# Patient Record
Sex: FEMALE | Race: WHITE | NOT HISPANIC OR LATINO | URBAN - METROPOLITAN AREA
[De-identification: names, ages, dates, MRNs, and addresses within clinical notes are randomized per-mention and may not be internally consistent; named-entity substitution may affect disease eponyms.]

---

## 2017-03-15 ENCOUNTER — EMERGENCY (EMERGENCY)
Facility: HOSPITAL | Age: 67
LOS: 1 days | Discharge: HOME | End: 2017-03-15
Attending: EMERGENCY MEDICINE | Admitting: EMERGENCY MEDICINE
Payer: COMMERCIAL

## 2017-03-15 VITALS
DIASTOLIC BLOOD PRESSURE: 86 MMHG | TEMPERATURE: 98 F | HEART RATE: 99 BPM | SYSTOLIC BLOOD PRESSURE: 151 MMHG | RESPIRATION RATE: 19 BRPM | OXYGEN SATURATION: 99 % | WEIGHT: 225.09 LBS | HEIGHT: 65 IN

## 2017-03-15 DIAGNOSIS — S82.831A OTHER FRACTURE OF UPPER AND LOWER END OF RIGHT FIBULA, INITIAL ENCOUNTER FOR CLOSED FRACTURE: ICD-10-CM

## 2017-03-15 DIAGNOSIS — M25.571 PAIN IN RIGHT ANKLE AND JOINTS OF RIGHT FOOT: ICD-10-CM

## 2017-03-15 DIAGNOSIS — I10 ESSENTIAL (PRIMARY) HYPERTENSION: ICD-10-CM

## 2017-03-15 PROCEDURE — 73630 X-RAY EXAM OF FOOT: CPT | Mod: 26,RT

## 2017-03-15 PROCEDURE — 99284 EMERGENCY DEPT VISIT MOD MDM: CPT | Mod: 25

## 2017-03-15 PROCEDURE — 73610 X-RAY EXAM OF ANKLE: CPT | Mod: 26,RT

## 2017-03-15 PROCEDURE — 73610 X-RAY EXAM OF ANKLE: CPT | Mod: 26,77,RT

## 2017-03-15 PROCEDURE — 27840 TREAT ANKLE DISLOCATION: CPT | Mod: 54,RT,59

## 2017-03-15 PROCEDURE — 27788 TREATMENT OF ANKLE FRACTURE: CPT | Mod: 54,RT

## 2017-03-15 RX ORDER — TRAMADOL HYDROCHLORIDE 50 MG/1
50 TABLET ORAL ONCE
Qty: 0 | Refills: 0 | Status: DISCONTINUED | OUTPATIENT
Start: 2017-03-15 | End: 2017-03-15

## 2017-03-15 RX ORDER — DOCUSATE SODIUM 100 MG
1 CAPSULE ORAL
Qty: 20 | Refills: 0 | OUTPATIENT
Start: 2017-03-15

## 2017-03-15 RX ADMIN — TRAMADOL HYDROCHLORIDE 50 MILLIGRAM(S): 50 TABLET ORAL at 14:20

## 2017-03-15 NOTE — ED PROVIDER NOTE - MEDICAL DECISION MAKING DETAILS
pt found to have closed R distal fib fx above the mortise, joint space intact otherwise, closed reduction performed at bedside by me, pt placed in posterior U splint, post-reduction film reveals adequate alignment, pt is NV intact post-splinting, pt instructed to RICE affected joint and f/u with Dr. Calloway in 1 wk, crutches, splint care and d/c instructions provided. pt found to have closed R distal fib fx above the mortise with slight widening, closed reduction performed at bedside by me, pt placed in posterior U splint, post-reduction film reveals adequate alignment, pt is NV intact post-splinting, pt instructed to RICE affected joint and f/u with Dr. Calloway in 1 wk, crutches, splint care and d/c instructions provided. pt found to have closed R distal fib fx above the mortise with slight widening, closed reduction performed at bedside by me, pt placed in posterior U splint, post-reduction film reveals adequate alignment, pt is NV intact post-splinting, pt instructed to RICE affected joint and f/u with Dr. Calloway in 1 wk, crutches, splint care and d/c instructions provided. Social work consulted for appt, has appt for next wk, given home care information as well

## 2017-03-15 NOTE — ED PROVIDER NOTE - OBJECTIVE STATEMENT
67 yo F with PMHx of HTN and hypothyroidism, presenting c/o R ankle and foot pain s/p fall this morning. Pt slipped on black ice and landed on her ankle. Noted swelling and progressively worsening pain.  Denies prodrome, head trauma, LOC, break in the skin, paresthesia, numbness, tingling, redness, bleeding, d/c, HA, dizziness, SOB, CP, palpitations, N/V, focal weakness, and malaise. 67 yo F with PMHx of HTN and hypothyroidism, presenting c/o R ankle and foot pain s/p fall this morning. Pt slipped on black ice and landed on her ankle. Noted swelling and progressively worsening pain.  Denies prodrome, head trauma, LOC, break in the skin, paresthesia, numbness, tingling, redness, bleeding, d/c, HA, dizziness, SOB, CP, palpitations, N/V, focal weakness, and malaise. Pt is able to partially weight bear s/p fall but reports feeling unstable when she walks

## 2017-03-15 NOTE — ED PROCEDURE NOTE - NS ED PERI NEURO NEG
Post-application: Motor, sensory, and vascular responses intact in the injured extremity./Pre-application: Motor, sensory, and vascular responses intact in the injured extremity.
Pre-application: Motor, sensory, and vascular responses intact in the injured extremity./Post-application: Motor, sensory, and vascular responses intact in the injured extremity.

## 2017-03-15 NOTE — ED PROVIDER NOTE - DIAGNOSTIC INTERPRETATION
Xray (wet reads) interpreted by TAYLOR JUAREZ   Xryuliana foot/ankle - Xray (wet reads) interpreted by TAYLOR JUAREZ   Xray foot/ankle - +soft tissue swelling with acute slightly displaced spiral fx of the distal fibular region, no dislocation, joint space intact, no effusion noted Xray (wet reads) interpreted by TAYLOR JUAREZ   Xray foot/ankle - +soft tissue swelling with acute slightly displaced spiral fx of the distal fibular region with widening of the mortise, no dislocation, joint space intact, no effusion noted, +DJD changes

## 2017-03-15 NOTE — ED BEHAVIORAL HEALTH NOTE - BEHAVIORAL HEALTH NOTE
Zahida was consulted to the ED to help arrange an appointment for the patient. Patient was in agreement with the doctors that were provided by the MD and did not have a preference as to what time or day. Appointment was made with Dr. Calloway for 3/22/17 @ 2:30 pm. Patient was provided with date and time as well as address and phone number to office. Patient was also provided with workers contact information should she have any more questions.

## 2017-03-15 NOTE — ED PROVIDER NOTE - DETAILS:
distal fibula fracture with widened mortise, reduced and splinted, neurovascularly intact, NWB, follow up with ortho

## 2017-03-15 NOTE — ED PROCEDURE NOTE - NS ED PERI VASCULAR NEG
no paresthesia/no cyanosis of extremity/capillary refill time < 2 seconds
no cyanosis of extremity/fingers/toes warm to touch/no paresthesia/capillary refill time < 2 seconds

## 2017-03-15 NOTE — ED PROCEDURE NOTE - CPROC ED POST PROC CARE GUIDE1
Instructed patient/caregiver to follow-up with primary care physician./Elevate the injured extremity as instructed./Verbal/written post procedure instructions were given to patient/caregiver./Keep the cast/splint/dressing clean and dry.

## 2017-03-15 NOTE — ED PROVIDER NOTE - MUSCULOSKELETAL, MLM
Spine appears normal, R foot and ankle +edema and TTP over lateral mal and medial dorsum foot, no erythema, ecchymosis, crepitus, joint laxity, or deformity, restricted ROM 2/2 pain, NV intact. able to partially weight bear with assistance

## 2017-03-15 NOTE — ED ADULT NURSE NOTE - OBJECTIVE STATEMENT
pt slipped twisting right ankle. Pt states when she walks on the right foot she feels her bone move. Area is swollen and red.

## 2017-03-23 ENCOUNTER — INPATIENT (INPATIENT)
Facility: HOSPITAL | Age: 67
LOS: 0 days | Discharge: HOME | DRG: 494 | End: 2017-03-24
Attending: ORTHOPAEDIC SURGERY | Admitting: ORTHOPAEDIC SURGERY
Payer: COMMERCIAL

## 2017-03-23 VITALS
OXYGEN SATURATION: 97 % | HEART RATE: 93 BPM | DIASTOLIC BLOOD PRESSURE: 85 MMHG | WEIGHT: 225.09 LBS | TEMPERATURE: 98 F | RESPIRATION RATE: 18 BRPM | SYSTOLIC BLOOD PRESSURE: 147 MMHG

## 2017-03-23 DIAGNOSIS — E03.9 HYPOTHYROIDISM, UNSPECIFIED: ICD-10-CM

## 2017-03-23 DIAGNOSIS — Z01.818 ENCOUNTER FOR OTHER PREPROCEDURAL EXAMINATION: ICD-10-CM

## 2017-03-23 DIAGNOSIS — S82.891A OTHER FRACTURE OF RIGHT LOWER LEG, INITIAL ENCOUNTER FOR CLOSED FRACTURE: ICD-10-CM

## 2017-03-23 PROCEDURE — 99253 IP/OBS CNSLTJ NEW/EST LOW 45: CPT

## 2017-03-23 PROCEDURE — 99285 EMERGENCY DEPT VISIT HI MDM: CPT | Mod: 25

## 2017-03-23 PROCEDURE — 93010 ELECTROCARDIOGRAM REPORT: CPT

## 2017-03-23 PROCEDURE — 71010: CPT | Mod: 26

## 2017-03-23 RX ORDER — DOCUSATE SODIUM 100 MG
1 CAPSULE ORAL
Qty: 42 | Refills: 0 | OUTPATIENT
Start: 2017-03-23 | End: 2017-04-06

## 2017-03-23 RX ORDER — OXYCODONE HYDROCHLORIDE 5 MG/1
5 TABLET ORAL EVERY 4 HOURS
Qty: 0 | Refills: 0 | Status: DISCONTINUED | OUTPATIENT
Start: 2017-03-23 | End: 2017-03-24

## 2017-03-23 RX ORDER — METOCLOPRAMIDE HCL 10 MG
10 TABLET ORAL ONCE
Qty: 0 | Refills: 0 | Status: DISCONTINUED | OUTPATIENT
Start: 2017-03-23 | End: 2017-03-24

## 2017-03-23 RX ORDER — ONDANSETRON 8 MG/1
4 TABLET, FILM COATED ORAL EVERY 6 HOURS
Qty: 0 | Refills: 0 | Status: DISCONTINUED | OUTPATIENT
Start: 2017-03-23 | End: 2017-03-24

## 2017-03-23 RX ORDER — ASPIRIN/CALCIUM CARB/MAGNESIUM 324 MG
325 TABLET ORAL
Qty: 0 | Refills: 0 | Status: DISCONTINUED | OUTPATIENT
Start: 2017-03-23 | End: 2017-03-24

## 2017-03-23 RX ORDER — ACETAMINOPHEN 500 MG
650 TABLET ORAL EVERY 6 HOURS
Qty: 0 | Refills: 0 | Status: DISCONTINUED | OUTPATIENT
Start: 2017-03-23 | End: 2017-03-24

## 2017-03-23 RX ORDER — SODIUM CHLORIDE 9 MG/ML
1000 INJECTION INTRAMUSCULAR; INTRAVENOUS; SUBCUTANEOUS
Qty: 0 | Refills: 0 | Status: DISCONTINUED | OUTPATIENT
Start: 2017-03-23 | End: 2017-03-24

## 2017-03-23 RX ORDER — SODIUM CHLORIDE 9 MG/ML
1000 INJECTION, SOLUTION INTRAVENOUS
Qty: 0 | Refills: 0 | Status: DISCONTINUED | OUTPATIENT
Start: 2017-03-23 | End: 2017-03-24

## 2017-03-23 RX ORDER — OXYCODONE HYDROCHLORIDE 5 MG/1
10 TABLET ORAL EVERY 4 HOURS
Qty: 0 | Refills: 0 | Status: DISCONTINUED | OUTPATIENT
Start: 2017-03-23 | End: 2017-03-24

## 2017-03-23 RX ORDER — MORPHINE SULFATE 50 MG/1
4 CAPSULE, EXTENDED RELEASE ORAL EVERY 4 HOURS
Qty: 0 | Refills: 0 | Status: DISCONTINUED | OUTPATIENT
Start: 2017-03-23 | End: 2017-03-24

## 2017-03-23 RX ORDER — INFLUENZA VIRUS VACCINE 15; 15; 15; 15 UG/.5ML; UG/.5ML; UG/.5ML; UG/.5ML
0.5 SUSPENSION INTRAMUSCULAR ONCE
Qty: 0 | Refills: 0 | Status: DISCONTINUED | OUTPATIENT
Start: 2017-03-23 | End: 2017-03-24

## 2017-03-23 RX ORDER — CEFAZOLIN SODIUM 1 G
2000 VIAL (EA) INJECTION EVERY 8 HOURS
Qty: 0 | Refills: 0 | Status: COMPLETED | OUTPATIENT
Start: 2017-03-23 | End: 2017-03-24

## 2017-03-23 RX ORDER — MORPHINE SULFATE 50 MG/1
4 CAPSULE, EXTENDED RELEASE ORAL
Qty: 0 | Refills: 0 | Status: DISCONTINUED | OUTPATIENT
Start: 2017-03-23 | End: 2017-03-24

## 2017-03-23 RX ORDER — ASPIRIN/CALCIUM CARB/MAGNESIUM 324 MG
1 TABLET ORAL
Qty: 56 | Refills: 0 | OUTPATIENT
Start: 2017-03-23

## 2017-03-23 RX ADMIN — Medication 100 MILLIGRAM(S): at 23:38

## 2017-03-23 NOTE — H&P ADULT - NSHPPHYSICALEXAM_GEN_ALL_CORE
PE:  Gen: WD/WN in NAD, body habitus appropriate for age  Skin: Warm/dry, good mobility/turgor. No rashes or discoloration around toes or r ankle splint  MS: EHL/FHL strength 5/5 BL LE, unable to assess right TA/GS due to splint. Sensation intact BL LE. No discoloration, warmth, or pallor to toes

## 2017-03-23 NOTE — H&P ADULT - NSHPREVIEWOFSYSTEMS_GEN_ALL_CORE
ROS:  Gen: Denies headaches, weight loss/gain, fatigue, fever, chills, sweats  MS: Denies loss of mobility to toes, knees. Denies pain in toes or knees. Denies numbness, tingling, loss of sensation to BL  see HPI

## 2017-03-23 NOTE — H&P ADULT - ASSESSMENT
66F with right ankle fracture s/p fall 1 week ago    NPO  Added on to OR today for ankle ORIF  Discussed with Dr Calloway

## 2017-03-23 NOTE — ED ADULT TRIAGE NOTE - CHIEF COMPLAINT QUOTE
right ankle pain x 2 weeks. Sent here by Dr. Bernabe Ko for surgery to right ankle fx.  Denies any other symptoms

## 2017-03-23 NOTE — ED ADULT NURSE NOTE - OBJECTIVE STATEMENT
Pt came to ED under direction of her PCP for pre op for right ankle surgery. Pt currently denies pain. Pt states she fractured her ankle 1 week ago.  Right lower leg and ankle wrapped in cast. Pt denies chest pain, SOB, abd pain, /GI symptoms, D/N/V.  Pt is alert and oriented x3, Positive PMS x4.

## 2017-03-23 NOTE — PROGRESS NOTE ADULT - SUBJECTIVE AND OBJECTIVE BOX
Ortho Post Op Check    Pt comfortable without complaints, pain controlled  Denies CP, SOB, N/V, numbness/tingling     Vital Signs Last 24 Hrs  T(C): 36.6, Max: 36.6 (03-23 @ 18:05)  T(F): 97.8, Max: 97.8 (03-23 @ 18:05)  HR: 66 (65 - 93)  BP: 142/74 (137/72 - 147/85)  BP(mean): --  RR: 14 (13 - 17)  SpO2: 100% (97% - 100%)  Wt(kg): --  AVSS    General: Pt Alert and oriented, NAD    Affected extremity  DSG C/D/I  Motor:  not able to assess EHL/FHL/TA/GS, patient in splint  Sensory: unable to assess  wwp                                                                       13.9   10.5  )-----------( 295      ( 23 Mar 2017 12:24 )             39.9   23 Mar 2017 12:23    144    |  109    |  14     ----------------------------<  81     3.3     |  24     |  0.70       TPro  7.8    /  Alb  3.9    /  TBili  1.2    /  DBili  x      /  AST  24     /  ALT  29     /  AlkPhos  65     23 Mar 2017 12:23      Post-op X-Ray:    A/P: 66yFemale POD#0 s/p R ankle ORIF  - Stable  - Pain Control  - DVT ppx: ASA  - Post op abx: Ancef  - PT, WBS:NWB RLE

## 2017-03-23 NOTE — H&P ADULT - HISTORY OF PRESENT ILLNESS
67 yo F here w/ known right ankle fracture s/p fall in snow 1 week ago. Pt states she missed a step and tripped, and when she got up to walk her ankle was in sharp pain, 5/10 in severity, that only hurts her when walking on it. She went to [facility] where she was given Percocet w/ good relief, radiographed, and splinted. At her follow up appointment yesterday she reports her ankle was swollen and bruised so she was radiographed again and recommended for surgery. Today she is at [facility] ED, stable, and in NAD. Denies numbness, tingling, loss of mobility in toes, fever, chills, sweats, dizziness.    Allergies: NKDA, NKFA  Meds: Synthroid 175 mcg  Illnesses: Hypothyroidism, well-controlled; Fibroids  SurgHx: Myomectomy 20+ years ago under general anesthesia, no complications   FamHx: DM on fathers side, no bleeding disorders or other conditions  SocHx: Never smoker, drinks 3 glasses wine/week. No recreational drugs. Eats a healthy diet[place]

## 2017-03-23 NOTE — ED PROVIDER NOTE - OBJECTIVE STATEMENT
67 y/o f presents to ED for admission for right fibula fracture sustained one week prior from fall.  Pt splinted by Nayla hernandez and followed up with Dr. Calloway day prior and instructed to present to ED.  Denies other injury at this time.  Only other sig hx hypothyroid on synthroid.

## 2017-03-23 NOTE — CONSULT NOTE ADULT - SUBJECTIVE AND OBJECTIVE BOX
CC: mild pain on right ankle.    ROS is otherwise negative.    Vital Signs Last 24 Hrs  T(C): 36.4, Max: 36.4 (03-23 @ 10:55)  T(F): 97.6, Max: 97.6 (03-23 @ 10:55)  HR: 93 (93 - 93)  BP: 147/85 (147/85 - 147/85)  BP(mean): --  RR: 17 (17 - 18)  SpO2: 97% (97% - 97%)    PHYSICAL EXAMINATION  * General: Not in acute distress. Awake and alert. Lying comfortably in bed.  * Head: Normocephalic, atraumatic.  * HEENT: ears no discharge, eyes PERRLA, nose no discharge, throat no exudates, normal tonsils.  * Neck: no JVD, supple.  * Lungs: Clear to auscultation, no rales, no wheezes.  * Cardio: Regular rate and rhythm, no murmurs, no rubs, no gallops. Good peripheral pulses.  * Abdomen: Soft, non-tender, non-distended, tympanic to percussion, no rebound, no guarding, no rigidity. Bowel sounds present. No suprapubic or CVA tenderness.  * : Deferred.  * Extremities: Acyanotic, no edema.  * Skin: Warm and dry.  * Neuro: Alert and oriented x 3. No focal deficits. Motor strength is 5/5 throughout. Sensation intact. Cranial nerves II-XII grossly intact.                           13.9   10.5  )-----------( 295      ( 23 Mar 2017 12:24 )             39.9     23 Mar 2017 12:23    144    |  109    |  14     ----------------------------<  81     3.3     |  24     |  0.70     Ca    9.2        23 Mar 2017 12:23    TPro  7.8    /  Alb  3.9    /  TBili  1.2    /  DBili  x      /  AST  24     /  ALT  29     /  AlkPhos  65     23 Mar 2017 12:23    PT/INR - ( 23 Mar 2017 12:24 )   PT: 12.1 sec;   INR: 1.09          PTT - ( 23 Mar 2017 12:24 )  PTT:33.8 sec

## 2017-03-23 NOTE — CONSULT NOTE ADULT - PROBLEM SELECTOR RECOMMENDATION 9
METS>4  RCRI score: 0.4% risk  Sotomayor score: 0.02% risk  Patient is a low risk patient for intermediate risk procedure.  Patient is medically optimized and clear for surgery.

## 2017-03-23 NOTE — ED PROVIDER NOTE - MEDICAL DECISION MAKING DETAILS
67 y/o f with right ankle albert fx - splinted one week prior - and sent to ED for admission for surgery by ortho, preop in ED - no acute issues.

## 2017-03-23 NOTE — CONSULT NOTE ADULT - ASSESSMENT
67yo F, PMH significant for hypothyroidism. Presents to ED after trip and fall on ice a week ago, twisting her right ankle. Found to have a right ankle fracture. Now admitted for right ankle ORIF. Consulted for pre-op medical clearance.

## 2017-03-23 NOTE — CONSULT NOTE ADULT - CONSULT REASON
CC: Pre-Op medical clearance.    67yo F, PMH significant for hypothyroidism. Presents to ED after trip and fall on ice a week ago, twisting her right ankle. Found to have a right ankle fracture. Now admitted for right ankle ORIF. Consulted for pre-op medical clearance.    Past Medical History  * Hypothyroidism.    Past Surgical history.  * Myomectomy    Medications:   * Synthroid 175mcg po qAM.    No significant FH.   SH: Never a smoker. Drinks 2-3 glasses of wine a week.. No IVDA.  NKDA.

## 2017-03-23 NOTE — H&P ADULT - NSHPSOCIALHISTORY_GEN_ALL_CORE
SocHx: Never smoker, drinks 3 glasses wine/week. No recreational drugs. Eats a healthy diet[place]

## 2017-03-24 VITALS
DIASTOLIC BLOOD PRESSURE: 76 MMHG | SYSTOLIC BLOOD PRESSURE: 134 MMHG | HEART RATE: 71 BPM | TEMPERATURE: 98 F | RESPIRATION RATE: 15 BRPM | OXYGEN SATURATION: 97 %

## 2017-03-24 DIAGNOSIS — Z98.890 OTHER SPECIFIED POSTPROCEDURAL STATES: ICD-10-CM

## 2017-03-24 LAB
ANION GAP SERPL CALC-SCNC: 10 MMOL/L — SIGNIFICANT CHANGE UP (ref 9–16)
BUN SERPL-MCNC: 10 MG/DL — SIGNIFICANT CHANGE UP (ref 7–23)
CALCIUM SERPL-MCNC: 8.5 MG/DL — SIGNIFICANT CHANGE UP (ref 8.5–10.5)
CHLORIDE SERPL-SCNC: 108 MMOL/L — SIGNIFICANT CHANGE UP (ref 96–108)
CO2 SERPL-SCNC: 24 MMOL/L — SIGNIFICANT CHANGE UP (ref 22–31)
CREAT SERPL-MCNC: 0.66 MG/DL — SIGNIFICANT CHANGE UP (ref 0.5–1.3)
GLUCOSE SERPL-MCNC: 131 MG/DL — HIGH (ref 70–99)
HCT VFR BLD CALC: 34.5 % — SIGNIFICANT CHANGE UP (ref 34.5–45)
HGB BLD-MCNC: 12 G/DL — SIGNIFICANT CHANGE UP (ref 11.5–15.5)
MCHC RBC-ENTMCNC: 31.2 PG — SIGNIFICANT CHANGE UP (ref 27–34)
MCHC RBC-ENTMCNC: 34.8 G/DL — SIGNIFICANT CHANGE UP (ref 32–36)
MCV RBC AUTO: 89.6 FL — SIGNIFICANT CHANGE UP (ref 80–100)
PLATELET # BLD AUTO: 264 K/UL — SIGNIFICANT CHANGE UP (ref 150–400)
POTASSIUM SERPL-MCNC: 3.5 MMOL/L — SIGNIFICANT CHANGE UP (ref 3.5–5.3)
POTASSIUM SERPL-SCNC: 3.5 MMOL/L — SIGNIFICANT CHANGE UP (ref 3.5–5.3)
RBC # BLD: 3.85 M/UL — SIGNIFICANT CHANGE UP (ref 3.8–5.2)
RBC # FLD: 13.4 % — SIGNIFICANT CHANGE UP (ref 10.3–16.9)
SODIUM SERPL-SCNC: 142 MMOL/L — SIGNIFICANT CHANGE UP (ref 135–145)
WBC # BLD: 11.4 K/UL — HIGH (ref 3.8–10.5)
WBC # FLD AUTO: 11.4 K/UL — HIGH (ref 3.8–10.5)

## 2017-03-24 PROCEDURE — 80053 COMPREHEN METABOLIC PANEL: CPT

## 2017-03-24 PROCEDURE — 71045 X-RAY EXAM CHEST 1 VIEW: CPT

## 2017-03-24 PROCEDURE — 93005 ELECTROCARDIOGRAM TRACING: CPT

## 2017-03-24 PROCEDURE — 86901 BLOOD TYPING SEROLOGIC RH(D): CPT

## 2017-03-24 PROCEDURE — 85610 PROTHROMBIN TIME: CPT

## 2017-03-24 PROCEDURE — C1713: CPT

## 2017-03-24 PROCEDURE — 99232 SBSQ HOSP IP/OBS MODERATE 35: CPT

## 2017-03-24 PROCEDURE — 85730 THROMBOPLASTIN TIME PARTIAL: CPT

## 2017-03-24 PROCEDURE — 36415 COLL VENOUS BLD VENIPUNCTURE: CPT

## 2017-03-24 PROCEDURE — 85027 COMPLETE CBC AUTOMATED: CPT

## 2017-03-24 PROCEDURE — 86900 BLOOD TYPING SEROLOGIC ABO: CPT

## 2017-03-24 PROCEDURE — C1769: CPT

## 2017-03-24 PROCEDURE — 80048 BASIC METABOLIC PNL TOTAL CA: CPT

## 2017-03-24 PROCEDURE — 86850 RBC ANTIBODY SCREEN: CPT

## 2017-03-24 PROCEDURE — 97161 PT EVAL LOW COMPLEX 20 MIN: CPT

## 2017-03-24 PROCEDURE — 99285 EMERGENCY DEPT VISIT HI MDM: CPT | Mod: 25

## 2017-03-24 RX ORDER — LEVOTHYROXINE SODIUM 125 MCG
0 TABLET ORAL
Qty: 0 | Refills: 0 | COMMUNITY

## 2017-03-24 RX ORDER — ASPIRIN/CALCIUM CARB/MAGNESIUM 324 MG
1 TABLET ORAL
Qty: 0 | Refills: 0 | COMMUNITY
Start: 2017-03-24

## 2017-03-24 RX ORDER — LEVOTHYROXINE SODIUM 125 MCG
1 TABLET ORAL
Qty: 0 | Refills: 0 | COMMUNITY
Start: 2017-03-24

## 2017-03-24 RX ORDER — SENNA PLUS 8.6 MG/1
2 TABLET ORAL AT BEDTIME
Qty: 0 | Refills: 0 | Status: DISCONTINUED | OUTPATIENT
Start: 2017-03-24 | End: 2017-03-24

## 2017-03-24 RX ORDER — LEVOTHYROXINE SODIUM 125 MCG
175 TABLET ORAL DAILY
Qty: 0 | Refills: 0 | Status: DISCONTINUED | OUTPATIENT
Start: 2017-03-24 | End: 2017-03-24

## 2017-03-24 RX ORDER — DOCUSATE SODIUM 100 MG
1 CAPSULE ORAL
Qty: 0 | Refills: 0 | COMMUNITY
Start: 2017-03-24

## 2017-03-24 RX ORDER — DOCUSATE SODIUM 100 MG
100 CAPSULE ORAL THREE TIMES A DAY
Qty: 0 | Refills: 0 | Status: DISCONTINUED | OUTPATIENT
Start: 2017-03-24 | End: 2017-03-24

## 2017-03-24 RX ORDER — OXYCODONE HYDROCHLORIDE 5 MG/1
1 TABLET ORAL
Qty: 60 | Refills: 0 | OUTPATIENT
Start: 2017-03-24

## 2017-03-24 RX ADMIN — Medication 175 MICROGRAM(S): at 11:12

## 2017-03-24 RX ADMIN — OXYCODONE HYDROCHLORIDE 5 MILLIGRAM(S): 5 TABLET ORAL at 09:55

## 2017-03-24 RX ADMIN — Medication 100 MILLIGRAM(S): at 06:25

## 2017-03-24 RX ADMIN — Medication 100 MILLIGRAM(S): at 14:48

## 2017-03-24 RX ADMIN — Medication 325 MILLIGRAM(S): at 05:53

## 2017-03-24 RX ADMIN — OXYCODONE HYDROCHLORIDE 5 MILLIGRAM(S): 5 TABLET ORAL at 08:25

## 2017-03-24 RX ADMIN — OXYCODONE HYDROCHLORIDE 5 MILLIGRAM(S): 5 TABLET ORAL at 14:15

## 2017-03-24 RX ADMIN — OXYCODONE HYDROCHLORIDE 5 MILLIGRAM(S): 5 TABLET ORAL at 14:30

## 2017-03-24 NOTE — PHYSICAL THERAPY INITIAL EVALUATION ADULT - THERAPY FREQUENCY, PT EVAL
DC PT - patient is independent for bed mobility, transfers and ambulation with both a standard walker and a knee scooter without assistance and with good safety awareness. Patient is safe to return home. Discussed with patient the idea of hiring a home health aide to assist with household ADLs

## 2017-03-24 NOTE — DISCHARGE NOTE ADULT - ADDITIONAL INSTRUCTIONS
No strenuous activity, heavy lifting, driving, tub bathing, or returning to work until cleared by MD.  You may sponge bathe.  Keep splint clean, dry, intact.   You are non weight bearing of the right lower extremity.  Follow up with Dr. Calloway in his office in 7 to 10 days from surgery.  Any staples/suture will be removed in his office.   If you don't have a bowel movement by post op day 3, then take Milk of Magnesia (over the counter).  If no bowel movement by at least post op day 5, then use a Dulcolax suppository (over the counter) and/or a Fleets enema--if still no bowel movement, call your MD.  Contact your doctor if you experience: fever greater than 101.5, chills, chest pain, difficulty breathing, bleeding, redness or heat around the incision.    Please follow up with your primary care provider.

## 2017-03-24 NOTE — PROGRESS NOTE ADULT - ASSESSMENT
65yo F, PMH significant for hypothyroidism. Presents to ED after trip and fall on ice a week ago, twisting her right ankle. Found to have a right ankle fracture. Now admitted for right ankle ORIF. POD-1 ORIF.

## 2017-03-24 NOTE — PHYSICAL THERAPY INITIAL EVALUATION ADULT - DIAGNOSIS, PT EVAL
Practice Pattern 4I: Impaired joint mobility, motor function, muscle performance and range of motion associated with bony or soft tissue surgery

## 2017-03-24 NOTE — PROGRESS NOTE ADULT - SUBJECTIVE AND OBJECTIVE BOX
POST OPERATIVE DAY #:  [1 ]   STATUS POST: [ ] Left [x ] Right distal fibula ORIF                SUBJECTIVE: Patient seen and examined. [ x   ]     Pain (0-10): <5 - Block wearing off  Pain Management:  [ ] Pain Controlled Analgesia   [ ] Peripheral Nerve Block    OBJECTIVE:     Vital Signs Last 24 Hrs  T(C): 36.5, Max: 36.7 (03-23 @ 20:30)  T(F): 97.7, Max: 98 (03-23 @ 20:30)  HR: 72 (62 - 93)  BP: 137/78 (126/69 - 156/83)  BP(mean): --  RR: 16 (13 - 18)  SpO2: 96% (95% - 100%)    Affected extremity:          Dressing: [x ] clean/dry/intact  [ ] Other:  in SL splint         Sensation: [x ] intact to light touch  [ ] decreased: feels pins and needles - no swelling on exam seen on toes, block wearing off         Motor exam: [x  ] Able to range all toes         [x ] warm well perfused; capillary refill <3 seconds     LABS:                        13.9   10.5  )-----------( 295      ( 23 Mar 2017 12:24 )             39.9     23 Mar 2017 12:23    144    |  109    |  14     ----------------------------<  81     3.3     |  24     |  0.70     Ca    9.2        23 Mar 2017 12:23    TPro  7.8    /  Alb  3.9    /  TBili  1.2    /  DBili  x      /  AST  24     /  ALT  29     /  AlkPhos  65     23 Mar 2017 12:23    PT/INR - ( 23 Mar 2017 12:24 )   PT: 12.1 sec;   INR: 1.09          PTT - ( 23 Mar 2017 12:24 )  PTT:33.8 sec      MEDICATIONS:sodium chloride 0.9%. 1000milliLiter(s) IV Continuous <Continuous>  acetaminophen   Tablet. 650milliGRAM(s) Oral every 6 hours PRN  oxyCODONE IR 10milliGRAM(s) Oral every 4 hours PRN  oxyCODONE IR 5milliGRAM(s) Oral every 4 hours PRN  morphine  - Injectable 4milliGRAM(s) IV Push every 4 hours PRN  ondansetron Injectable 4milliGRAM(s) IV Push every 6 hours PRN  lactated ringers. 1000milliLiter(s) IV Continuous <Continuous>  morphine  - Injectable 4milliGRAM(s) IV Push every 15 minutes PRN  ondansetron Injectable 4milliGRAM(s) IV Push every 6 hours PRN  metoclopramide Injectable 10milliGRAM(s) IV Push once PRN  aspirin enteric coated 325milliGRAM(s) Oral two times a day  influenza   Vaccine 0.5milliLiter(s) IntraMuscular once    Anticoagulation:  aspirin enteric coated 325milliGRAM(s) Oral two times a day      Antibiotics:       Pain medications:   acetaminophen   Tablet. 650milliGRAM(s) Oral every 6 hours PRN  oxyCODONE IR 10milliGRAM(s) Oral every 4 hours PRN  oxyCODONE IR 5milliGRAM(s) Oral every 4 hours PRN  morphine  - Injectable 4milliGRAM(s) IV Push every 4 hours PRN  ondansetron Injectable 4milliGRAM(s) IV Push every 6 hours PRN  morphine  - Injectable 4milliGRAM(s) IV Push every 15 minutes PRN  ondansetron Injectable 4milliGRAM(s) IV Push every 6 hours PRN  metoclopramide Injectable 10milliGRAM(s) IV Push once PRN      RADIOLOGY & ADDITIONAL STUDIES:    A/P :  sodium chloride 0.9%. 1000milliLiter(s) IV Continuous <Continuous>  acetaminophen   Tablet. 650milliGRAM(s) Oral every 6 hours PRN  oxyCODONE IR 10milliGRAM(s) Oral every 4 hours PRN  oxyCODONE IR 5milliGRAM(s) Oral every 4 hours PRN  morphine  - Injectable 4milliGRAM(s) IV Push every 4 hours PRN  ondansetron Injectable 4milliGRAM(s) IV Push every 6 hours PRN  lactated ringers. 1000milliLiter(s) IV Continuous <Continuous>  morphine  - Injectable 4milliGRAM(s) IV Push every 15 minutes PRN  ondansetron Injectable 4milliGRAM(s) IV Push every 6 hours PRN  metoclopramide Injectable 10milliGRAM(s) IV Push once PRN  aspirin enteric coated 325milliGRAM(s) Oral two times a day  influenza   Vaccine 0.5milliLiter(s) IntraMuscular once   aspirin enteric coated 325milliGRAM(s) Oral two times a day   s/p Right [x ] Left [ ]  dist fibula ORIF  POD # 1  -    Pain control  -    DVT ppx: ASA81 [ ] [id number] [x ] Lovenox [ ] Coumadin [ ] Heparin  [ ] Eliquis [ ] Xarelto [ ]   -    Periop abx:  Ancef [x ]  Vanco [ ]  Zosyn [ ] Rifampin [ ] Cipro [ ] Bactrim [ ] Ceftriaxone [ ]  Clinda [ ]  Metronidazole [ ]  -    ADAT  -    Check AM labs  -    Weight bearing status: WBAT [ ]        PWB    [ ]     TTWB  [ ]      NWB  [x ] w crutches  -    Resume home meds  -    Physical Therapy  -    Dispo: Home [x ]     Rehab [ ]      AYDE [ ]      To be determined [ ]  -    for Discharge today

## 2017-03-24 NOTE — PHYSICAL THERAPY INITIAL EVALUATION ADULT - RANGE OF MOTION EXAMINATION, REHAB EVAL
bilateral lower extremity ROM was WFL (within functional limits)/bilateral upper extremity ROM was WFL (within functional limits)/R ankle not tested

## 2017-03-24 NOTE — PHYSICAL THERAPY INITIAL EVALUATION ADULT - GENERAL OBSERVATIONS, REHAB EVAL
Received semi-supine in bed with +RLE ACE bandage with posterior splint, on room air, +hep lock, in no apparent distress.

## 2017-03-24 NOTE — DISCHARGE NOTE ADULT - CARE PLAN
Principal Discharge DX:	Fracture of right ankle, closed, initial encounter  Goal:	improvement after surgery  Instructions for follow-up, activity and diet:	see below

## 2017-03-24 NOTE — PHYSICAL THERAPY INITIAL EVALUATION ADULT - PERTINENT HX OF CURRENT PROBLEM, REHAB EVAL
Patient is a 67 yo F here w/ known right ankle fracture s/p fall in snow 1 week ago. Pt states she missed a step and tripped. Found to have a fracture. Here for R distal fibula ORIF

## 2017-03-24 NOTE — PHYSICAL THERAPY INITIAL EVALUATION ADULT - ADDITIONAL COMMENTS
Patient lives alone in an elevator apartment building with a ramp to enter. Prior to the fall, patient was independent for all functional mobility without assistive device, however patient states that since the 15th of March she has had trouble weightbearing on RLE and difficulty ambulating, causing her to use a standard walker.

## 2017-03-24 NOTE — DISCHARGE NOTE ADULT - PATIENT PORTAL LINK FT
“You can access the [employer] Patient Portal, offered by [facility], by registering with the following website: [email or web address]”

## 2017-03-24 NOTE — DISCHARGE NOTE ADULT - CARE PROVIDER_API CALL
Ray Calloway), Orthopaedic Surgery  [address] [place] [address]  Phone: [phone]  Fax: [phone]

## 2017-03-24 NOTE — PROGRESS NOTE ADULT - SUBJECTIVE AND OBJECTIVE BOX
Patient doing well.  No complaints.  ROS negative.  Tolerated surgery well.    Vital Signs Last 24 Hrs  T(C): 36.7, Max: 36.7 (03-23 @ 20:30)  T(F): 98, Max: 98 (03-23 @ 20:30)  HR: 71 (62 - 93)  BP: 134/76 (126/69 - 156/83)  BP(mean): --  RR: 15 (13 - 18)  SpO2: 97% (95% - 100%)    PHYSICAL EXAMINATION  * General: Not in acute distress. Awake and alert. Lying comfortably in bed.  * Head: Normocephalic, atraumatic.  * HEENT: ears no discharge, eyes PERRLA, nose no discharge, throat no exudates, normal tonsils.  * Neck: no JVD, supple.  * Lungs: Clear to auscultation, no rales, no wheezes.  * Cardio: Regular rate and rhythm, no murmurs, no rubs, no gallops. Good peripheral pulses.  * Abdomen: Soft, non-tender, non-distended, tympanic to percussion, no rebound, no guarding, no rigidity. Bowel sounds present. No suprapubic or CVA tenderness.  * : Deferred.  * Extremities: Acyanotic, no edema.  * Skin: Warm and dry.  * Neuro: Alert and oriented x 3. No focal deficits. Motor strength is 5/5 throughout. Sensation intact. Cranial nerves II-XII grossly intact.                           12.0   11.4  )-----------( 264      ( 24 Mar 2017 10:35 )             34.5     24 Mar 2017 10:35    142    |  108    |  10     ----------------------------<  131    3.5     |  24     |  0.66     Ca    8.5        24 Mar 2017 10:35    TPro  7.8    /  Alb  3.9    /  TBili  1.2    /  DBili  x      /  AST  24     /  ALT  29     /  AlkPhos  65     23 Mar 2017 12:23    MEDICATIONS  (STANDING):  sodium chloride 0.9%. 1000milliLiter(s) IV Continuous <Continuous>  lactated ringers. 1000milliLiter(s) IV Continuous <Continuous>  aspirin enteric coated 325milliGRAM(s) Oral two times a day  influenza   Vaccine 0.5milliLiter(s) IntraMuscular once  senna 2Tablet(s) Oral at bedtime  docusate sodium 100milliGRAM(s) Oral three times a day  levothyroxine 175MICROGram(s) Oral daily    MEDICATIONS  (PRN):  acetaminophen   Tablet. 650milliGRAM(s) Oral every 6 hours PRN Mild Pain (1 - 3)  oxyCODONE IR 10milliGRAM(s) Oral every 4 hours PRN Severe Pain (7 - 10)  oxyCODONE IR 5milliGRAM(s) Oral every 4 hours PRN Moderate Pain (4 - 6)  morphine  - Injectable 4milliGRAM(s) IV Push every 4 hours PRN breakthrough pain  ondansetron Injectable 4milliGRAM(s) IV Push every 6 hours PRN Nausea and/or Vomiting  morphine  - Injectable 4milliGRAM(s) IV Push every 15 minutes PRN Severe Pain  ondansetron Injectable 4milliGRAM(s) IV Push every 6 hours PRN Nausea  metoclopramide Injectable 10milliGRAM(s) IV Push once PRN Nausea and/or Vomiting

## 2017-03-24 NOTE — PHYSICAL THERAPY INITIAL EVALUATION ADULT - MANUAL MUSCLE TESTING RESULTS, REHAB EVAL
Strength grossly at least 3+/5 based on functional assessment including bed mobility, transfers and ambulation

## 2017-03-24 NOTE — DISCHARGE NOTE ADULT - MEDICATION SUMMARY - MEDICATIONS TO TAKE
I will START or STAY ON the medications listed below when I get home from the hospital:    aspirin 325 mg oral delayed release tablet  -- 1 tab(s) by mouth 2 times a day x 4 weeks from the date of surgery  -- Indication: For clot prevention    acetaminophen-oxyCODONE 325 mg-5 mg oral tablet  -- 1 to 2 tab(s) by mouth every 4 hours, As Needed MDD:eight  -- Caution federal law prohibits the transfer of this drug to any person other  than the person for whom it was prescribed.  May cause drowsiness.  Alcohol may intensify this effect.  Use care when operating dangerous machinery.  This prescription cannot be refilled.  This product contains acetaminophen.  Do not use  with any other product containing acetaminophen to prevent possible liver damage.  Using more of this medication than prescribed may cause serious breathing problems.    -- Indication: For Pain    docusate sodium 100 mg oral capsule  -- 1 cap(s) by mouth 3 times a day  -- Indication: For constipation    levothyroxine 175 mcg (0.175 mg) oral tablet  -- 1 tab(s) by mouth once a day  -- Indication: For Home med

## 2017-03-24 NOTE — DISCHARGE NOTE ADULT - MEDICATION SUMMARY - MEDICATIONS TO CHANGE
I will SWITCH the dose or number of times a day I take the medications listed below when I get home from the hospital:    Percocet 5/325 325 mg-5 mg oral tablet  -- 1 tab(s) by mouth every 6 hours, As Needed for moderate pain No refills MDD:4  -- Caution federal law prohibits the transfer of this drug to any person other  than the person for whom it was prescribed.  May cause drowsiness.  Alcohol may intensify this effect.  Use care when operating dangerous machinery.  This prescription cannot be refilled.  This product contains acetaminophen.  Do not use  with any other product containing acetaminophen to prevent possible liver damage.  Using more of this medication than prescribed may cause serious breathing problems.
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
## 2017-03-27 DIAGNOSIS — S93.421A SPRAIN OF DELTOID LIGAMENT OF RIGHT ANKLE, INITIAL ENCOUNTER: ICD-10-CM
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
## 2017-03-27 DIAGNOSIS — S82.841A DISPLACED BIMALLEOLAR FRACTURE OF RIGHT LOWER LEG, INITIAL ENCOUNTER FOR CLOSED FRACTURE: ICD-10-CM

## 2017-03-27 DIAGNOSIS — Z79.52 LONG TERM (CURRENT) USE OF SYSTEMIC STEROIDS: ICD-10-CM

## 2017-03-27 DIAGNOSIS — E03.9 HYPOTHYROIDISM, UNSPECIFIED: ICD-10-CM
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
## 2017-07-14 ENCOUNTER — INPATIENT (INPATIENT)
Facility: HOSPITAL | Age: 67
LOS: 5 days | Discharge: HOME | DRG: 858 | End: 2017-07-20
Attending: ORTHOPAEDIC SURGERY | Admitting: ORTHOPAEDIC SURGERY
Payer: COMMERCIAL

## 2017-07-14 VITALS
HEART RATE: 75 BPM | SYSTOLIC BLOOD PRESSURE: 142 MMHG | RESPIRATION RATE: 18 BRPM | OXYGEN SATURATION: 99 % | WEIGHT: 199.52 LBS | DIASTOLIC BLOOD PRESSURE: 83 MMHG | TEMPERATURE: 98 F

## 2017-07-14 DIAGNOSIS — E03.9 HYPOTHYROIDISM, UNSPECIFIED: ICD-10-CM

## 2017-07-14 DIAGNOSIS — S99.911S UNSPECIFIED INJURY OF RIGHT ANKLE, SEQUELA: Chronic | ICD-10-CM

## 2017-07-14 DIAGNOSIS — I10 ESSENTIAL (PRIMARY) HYPERTENSION: ICD-10-CM

## 2017-07-14 DIAGNOSIS — T14.8 OTHER INJURY OF UNSPECIFIED BODY REGION: ICD-10-CM

## 2017-07-14 LAB
ALBUMIN SERPL ELPH-MCNC: 3.7 G/DL — SIGNIFICANT CHANGE UP (ref 3.3–5)
ALP SERPL-CCNC: 61 U/L — SIGNIFICANT CHANGE UP (ref 40–120)
ALT FLD-CCNC: 16 U/L — SIGNIFICANT CHANGE UP (ref 10–45)
ANION GAP SERPL CALC-SCNC: 12 MMOL/L — SIGNIFICANT CHANGE UP (ref 5–17)
APTT BLD: 34.4 SEC — SIGNIFICANT CHANGE UP (ref 27.5–37.4)
AST SERPL-CCNC: 26 U/L — SIGNIFICANT CHANGE UP (ref 10–40)
BASOPHILS NFR BLD AUTO: 0.5 % — SIGNIFICANT CHANGE UP (ref 0–2)
BILIRUB SERPL-MCNC: 0.5 MG/DL — SIGNIFICANT CHANGE UP (ref 0.2–1.2)
BLD GP AB SCN SERPL QL: NEGATIVE — SIGNIFICANT CHANGE UP
BUN SERPL-MCNC: 17 MG/DL — SIGNIFICANT CHANGE UP (ref 7–23)
CALCIUM SERPL-MCNC: 9.5 MG/DL — SIGNIFICANT CHANGE UP (ref 8.4–10.5)
CHLORIDE SERPL-SCNC: 103 MMOL/L — SIGNIFICANT CHANGE UP (ref 96–108)
CO2 SERPL-SCNC: 24 MMOL/L — SIGNIFICANT CHANGE UP (ref 22–31)
CREAT SERPL-MCNC: 0.6 MG/DL — SIGNIFICANT CHANGE UP (ref 0.5–1.3)
EOSINOPHIL NFR BLD AUTO: 1.2 % — SIGNIFICANT CHANGE UP (ref 0–6)
GLUCOSE SERPL-MCNC: 92 MG/DL — SIGNIFICANT CHANGE UP (ref 70–99)
HCT VFR BLD CALC: 38.5 % — SIGNIFICANT CHANGE UP (ref 34.5–45)
HGB BLD-MCNC: 13.4 G/DL — SIGNIFICANT CHANGE UP (ref 11.5–15.5)
INR BLD: 1.05 — SIGNIFICANT CHANGE UP (ref 0.88–1.16)
LYMPHOCYTES # BLD AUTO: 17.6 % — SIGNIFICANT CHANGE UP (ref 13–44)
MCHC RBC-ENTMCNC: 31.6 PG — SIGNIFICANT CHANGE UP (ref 27–34)
MCHC RBC-ENTMCNC: 34.8 G/DL — SIGNIFICANT CHANGE UP (ref 32–36)
MCV RBC AUTO: 90.8 FL — SIGNIFICANT CHANGE UP (ref 80–100)
MONOCYTES NFR BLD AUTO: 7.7 % — SIGNIFICANT CHANGE UP (ref 2–14)
NEUTROPHILS NFR BLD AUTO: 73 % — SIGNIFICANT CHANGE UP (ref 43–77)
PLATELET # BLD AUTO: 294 K/UL — SIGNIFICANT CHANGE UP (ref 150–400)
POTASSIUM SERPL-MCNC: 4.5 MMOL/L — SIGNIFICANT CHANGE UP (ref 3.5–5.3)
POTASSIUM SERPL-SCNC: 4.5 MMOL/L — SIGNIFICANT CHANGE UP (ref 3.5–5.3)
PROT SERPL-MCNC: 7.2 G/DL — SIGNIFICANT CHANGE UP (ref 6–8.3)
PROTHROM AB SERPL-ACNC: 11.7 SEC — SIGNIFICANT CHANGE UP (ref 9.8–12.7)
RBC # BLD: 4.24 M/UL — SIGNIFICANT CHANGE UP (ref 3.8–5.2)
RBC # FLD: 13.5 % — SIGNIFICANT CHANGE UP (ref 10.3–16.9)
RH IG SCN BLD-IMP: POSITIVE — SIGNIFICANT CHANGE UP
SODIUM SERPL-SCNC: 139 MMOL/L — SIGNIFICANT CHANGE UP (ref 135–145)
WBC # BLD: 6.1 K/UL — SIGNIFICANT CHANGE UP (ref 3.8–10.5)
WBC # FLD AUTO: 6.1 K/UL — SIGNIFICANT CHANGE UP (ref 3.8–10.5)

## 2017-07-14 PROCEDURE — 93010 ELECTROCARDIOGRAM REPORT: CPT

## 2017-07-14 PROCEDURE — 73723 MRI JOINT LWR EXTR W/O&W/DYE: CPT | Mod: 26,RT

## 2017-07-14 PROCEDURE — 73700 CT LOWER EXTREMITY W/O DYE: CPT | Mod: 26,RT

## 2017-07-14 PROCEDURE — 99285 EMERGENCY DEPT VISIT HI MDM: CPT | Mod: 25

## 2017-07-14 PROCEDURE — 71020: CPT | Mod: 26

## 2017-07-14 PROCEDURE — 99253 IP/OBS CNSLTJ NEW/EST LOW 45: CPT | Mod: GC

## 2017-07-14 RX ORDER — LEVOTHYROXINE SODIUM 125 MCG
175 TABLET ORAL DAILY
Qty: 0 | Refills: 0 | Status: DISCONTINUED | OUTPATIENT
Start: 2017-07-14 | End: 2017-07-20

## 2017-07-14 RX ORDER — ACETAMINOPHEN 500 MG
650 TABLET ORAL EVERY 6 HOURS
Qty: 0 | Refills: 0 | Status: DISCONTINUED | OUTPATIENT
Start: 2017-07-14 | End: 2017-07-15

## 2017-07-14 RX ORDER — OXYCODONE HYDROCHLORIDE 5 MG/1
10 TABLET ORAL EVERY 4 HOURS
Qty: 0 | Refills: 0 | Status: DISCONTINUED | OUTPATIENT
Start: 2017-07-14 | End: 2017-07-15

## 2017-07-14 RX ORDER — CEFAZOLIN SODIUM 1 G
1000 VIAL (EA) INJECTION EVERY 8 HOURS
Qty: 0 | Refills: 0 | Status: DISCONTINUED | OUTPATIENT
Start: 2017-07-14 | End: 2017-07-15

## 2017-07-14 RX ORDER — SODIUM CHLORIDE 9 MG/ML
1000 INJECTION, SOLUTION INTRAVENOUS
Qty: 0 | Refills: 0 | Status: DISCONTINUED | OUTPATIENT
Start: 2017-07-14 | End: 2017-07-15

## 2017-07-14 RX ORDER — DOCUSATE SODIUM 100 MG
100 CAPSULE ORAL THREE TIMES A DAY
Qty: 0 | Refills: 0 | Status: DISCONTINUED | OUTPATIENT
Start: 2017-07-14 | End: 2017-07-15

## 2017-07-14 RX ORDER — OXYCODONE AND ACETAMINOPHEN 5; 325 MG/1; MG/1
1 TABLET ORAL EVERY 6 HOURS
Qty: 0 | Refills: 0 | Status: DISCONTINUED | OUTPATIENT
Start: 2017-07-14 | End: 2017-07-17

## 2017-07-14 RX ORDER — MORPHINE SULFATE 50 MG/1
2 CAPSULE, EXTENDED RELEASE ORAL EVERY 4 HOURS
Qty: 0 | Refills: 0 | Status: DISCONTINUED | OUTPATIENT
Start: 2017-07-14 | End: 2017-07-20

## 2017-07-14 RX ORDER — ACETAMINOPHEN 500 MG
650 TABLET ORAL EVERY 6 HOURS
Qty: 0 | Refills: 0 | Status: DISCONTINUED | OUTPATIENT
Start: 2017-07-14 | End: 2017-07-20

## 2017-07-14 RX ADMIN — Medication 100 MILLIGRAM(S): at 21:54

## 2017-07-14 RX ADMIN — Medication 100 MILLIGRAM(S): at 13:38

## 2017-07-14 NOTE — H&P ADULT - HISTORY OF PRESENT ILLNESS
66F pmhx hypothyroidism s/p right ankle ORIF 4 months ago presents to ED 66F pmhx hypothyroidism s/p right ankle ORIF 4 months ago presents to ED with delayed wound healing of right ankle. Pt denies right ankle pain, numbness/tingling of distal right lower extremity; denies fevers/chills. Pt states her rehabilitation from right ankle ORIF is going well and she is progressing with PT and advancing in weight bearing status, now ambulates with a cane. Pt had an eschar at middle of lateral surgical incision site which fell off approximately 2 weeks ago after which silvadene was applied to site and she was given PO keflex. Pt completed PO abx course this morning. Pt states a scab formed again and once again fell off, leaving an unhealed wound at mid-incision site. Pt reports drainage from incision site.

## 2017-07-14 NOTE — H&P ADULT - NSHPPHYSICALEXAM_GEN_ALL_CORE
Gen: Pt NAD, Afebrile  MSK: RLE EHL/FHL/TA/GS 5/5 motor strength, sensation in tact distally  Skin: Mild right ankle soft tissue swelling, skin warm and well perfused, DP pulses 2+;  Proximal aspect of right ankle surgical incision unapproximated with granulation tissue formation, erythema surrounding with moderate warmth to palpation

## 2017-07-14 NOTE — CONSULT NOTE ADULT - PROBLEM SELECTOR RECOMMENDATION 9
Patient without complete healing of surgical site, now with open wound with small amount of pus. Likely represented infected wound. Over incision site from prior R ankle ORIF. RCRI index 0. Patient currently unable to exercise due to pain in ankle, but has been able to work with PT and can walk flat surfaces at work without difficulty, dyspnea, or chest pain. Likely able to tolerate > 4 METS. Non-smoker, no illicit substance use. Did not have problems with anesthesia during prior surgery. EKG normal sinus without any focal abnormalities, CXR clear.   - Low risk patient for Patient without complete healing of surgical site, now with open wound with small amount of pus. Likely represented infected wound. Over incision site from prior R ankle ORIF. RCRI index 0. Patient currently unable to exercise due to pain in ankle, but has been able to work with PT and can walk flat surfaces at work without difficulty, dyspnea, or chest pain. Likely able to tolerate > 4 METS. Non-smoker, no illicit substance use. Did not have problems with anesthesia during prior surgery. EKG normal sinus without any focal abnormalities, CXR clear.   - Low risk patient for low to moderate risk procedure (unclear if hardware removal or simple debridement) Patient without complete healing of surgical site, now with open wound with small amount of pus. Likely represented infected wound. Over incision site from prior R ankle ORIF. RCRI index 0. Patient currently unable to exercise due to pain in ankle, but has been able to work with PT and can walk flat surfaces at work without difficulty, dyspnea, or chest pain. Likely able to tolerate 4 METS, limited due to ankle. Non-smoker, no illicit substance use. Did not have problems with anesthesia during prior surgery. EKG normal sinus without any focal abnormalities, CXR clear.   - Low risk patient for low to moderate risk procedure (unclear if hardware removal or simple debridement)  - medically optimized for procedure

## 2017-07-14 NOTE — CONSULT NOTE ADULT - PROBLEM SELECTOR RECOMMENDATION 3
Patient denies history of hypertension, but has had two BP readings in 150's systolic. Would hold off starting any new BP medications at this time. Patient should follow with PMD outpatient, and may need to start medication after surgery.

## 2017-07-14 NOTE — CONSULT NOTE ADULT - SUBJECTIVE AND OBJECTIVE BOX
Internal Medicine Consult Note    HPI:  66F pmhx hypothyroidism s/p right ankle ORIF 4 months ago presents to ED with delayed wound healing of right ankle. Pt denies right ankle pain, numbness/tingling of distal right lower extremity; denies fevers/chills. Pt states her rehabilitation from right ankle ORIF is going well and she is progressing with PT and advancing in weight bearing status, now ambulates with a cane. Pt had an eschar at middle of lateral surgical incision site which fell off approximately 2 weeks ago after which silvadene was applied to site and she was given PO keflex. Pt completed PO abx course this morning. Pt states a scab formed again and once again fell off, leaving an unhealed wound at mid-incision site. Pt reports drainage from incision site. (14 Jul 2017 11:16)    INTERVAL HISTORY:  Patient currently denies any pain in her ankle. States that since the surgery, she has been able to work with physical therapy without any SOB and is able to do some walking at work. Her ability to exercise and climb stairs is limited by pain. Her only home medication prior to her ankle fracture was Synthroid 175mcg, and she has been on the same dose of Synthroid for many years. She initially fractured her ankle after slipping on ice in March 2017. Denies any chest pain, palpitations, SOB, headache, dizziness, abdominal pain, nausea, vomiting, fevers, chills, diarrhea, constipation, dysuria, urinary symptoms. Denies any other symptoms.       MEDICATIONS  (STANDING):  levothyroxine 175 MICROGram(s) Oral daily  ceFAZolin   IVPB 1000 milliGRAM(s) IV Intermittent every 8 hours    MEDICATIONS  (PRN):    PAST MEDICAL & SURGICAL HISTORY:  Hypothyroid  Ankle injury, right, sequela  h/o R ankle ORIF March 2017    Allergies    No Known Allergies    Intolerances      FAMILY HISTORY:  Diabetes mellitus in mother  No family history of thyroid disease    SOCIAL HISTORY:  nonsmoker  occasional EtOH use, usually 1-2 glasses of wine  Denies other drug use  Lives alone    ROS:  CV: Denies chest pain, palpitations, dyspnea on exertion, orthopnea, peripheral edema  Resp: Denies SOB, wheezing  GI: Denies abdominal pain, constipation, diarrhea, nausea, vomiting  : Denies dysuria and incontinence  ID: Denies fevers, chills  Neuro: denies numbness or tingling in extremities, denies weakness other than R ankle, which has become stronger since procedure    PHYSICAL EXAM   Vital Signs Last 24 Hrs  T(C): 36.9 (14 Jul 2017 14:43), Max: 36.9 (14 Jul 2017 14:43)  T(F): 98.4 (14 Jul 2017 14:43), Max: 98.4 (14 Jul 2017 14:43)  HR: 68 (14 Jul 2017 14:43) (68 - 75)  BP: 151/88 (14 Jul 2017 14:43) (142/83 - 158/88)  BP(mean): --  RR: 18 (14 Jul 2017 14:43) (17 - 18)  SpO2: 99% (14 Jul 2017 14:43) (99% - 100%)      General: NAD, comfortable  HEENT: NCAT, PERRL, clear conjunctiva, no scleral icterus  Neck: supple, no JVD, no lymphadenopathy   Respiratory: CTA b/l, no wheezing, rhonchi, rales  Cardiovascular: RRR, normal S1S2, no M/R/G  Abdomen: soft, NT/ND, bowel sounds in all four quadrants, no palpable masses  Extremities: WWP, large lower extremities without any edema, ACE bandage around right ankle and foot, clean, dry, and intact.   Skin: 2cm x 0.25cm lesion on lateral right ankle, at site of prior op scar, with small amount of drainage and pus  Neuro: AAO to person, place, and time. Remote and recent memory grossly intact. No focal neuro deficits  Psych: normal mood and affect. speech normal      LABS                        13.4   6.1   )-----------( 294      ( 14 Jul 2017 11:08 )             38.5     07-14    139  |  103  |  17  ----------------------------<  92  4.5   |  24  |  0.60    Ca    9.5      14 Jul 2017 11:08    TPro  7.2  /  Alb  3.7  /  TBili  0.5  /  DBili  x   /  AST  26  /  ALT  16  /  AlkPhos  61  07-14    PT/INR - ( 14 Jul 2017 11:08 )   PT: 11.7 sec;   INR: 1.05          PTT - ( 14 Jul 2017 11:08 )  PTT:34.4 sec    IMAGING & OTHER STUDIES: reviewed Internal Medicine Consult Note    HPI:  66F pmhx hypothyroidism s/p right ankle ORIF 4 months ago presents to ED with delayed wound healing of right ankle. Pt denies right ankle pain, numbness/tingling of distal right lower extremity; denies fevers/chills. Pt states her rehabilitation from right ankle ORIF is going well and she is progressing with PT and advancing in weight bearing status, now ambulates with a cane. Pt had an eschar at middle of lateral surgical incision site which fell off approximately 2 weeks ago after which silvadene was applied to site and she was given PO keflex. Pt completed PO abx course this morning. Pt states a scab formed again and once again fell off, leaving an unhealed wound at mid-incision site. Pt reports drainage from incision site. (14 Jul 2017 11:16)    INTERVAL HISTORY:  Patient currently denies any pain in her ankle. States that since the surgery, she has been able to work with physical therapy without any SOB and is able to do some walking at work. Her ability to exercise and climb stairs is limited by pain. Her only home medication prior to her ankle fracture was Synthroid 175mcg, and she has been on the same dose of Synthroid for many years. Had been on about 1 week course of Keflex, last dose was today.  She initially fractured her ankle after slipping on ice in March 2017. Denies any chest pain, palpitations, SOB, headache, dizziness, abdominal pain, nausea, vomiting, fevers, chills, diarrhea, constipation, dysuria, urinary symptoms. Denies any other symptoms.       MEDICATIONS  (STANDING):  levothyroxine 175 MICROGram(s) Oral daily  ceFAZolin   IVPB 1000 milliGRAM(s) IV Intermittent every 8 hours    MEDICATIONS  (PRN):    PAST MEDICAL & SURGICAL HISTORY:  Hypothyroid  Ankle injury, right, sequela  h/o R ankle ORIF March 2017    Allergies    No Known Allergies    Intolerances      FAMILY HISTORY:  Diabetes mellitus in mother  No family history of thyroid disease    SOCIAL HISTORY:  nonsmoker  occasional EtOH use, usually 1-2 glasses of wine  Denies other drug use  Lives alone    ROS:  CV: Denies chest pain, palpitations, dyspnea on exertion, orthopnea, peripheral edema  Resp: Denies SOB, wheezing  GI: Denies abdominal pain, constipation, diarrhea, nausea, vomiting  : Denies dysuria and incontinence  ID: Denies fevers, chills  Neuro: denies numbness or tingling in extremities, denies weakness other than R ankle, which has become stronger since procedure    PHYSICAL EXAM   Vital Signs Last 24 Hrs  T(C): 36.9 (14 Jul 2017 14:43), Max: 36.9 (14 Jul 2017 14:43)  T(F): 98.4 (14 Jul 2017 14:43), Max: 98.4 (14 Jul 2017 14:43)  HR: 68 (14 Jul 2017 14:43) (68 - 75)  BP: 151/88 (14 Jul 2017 14:43) (142/83 - 158/88)  BP(mean): --  RR: 18 (14 Jul 2017 14:43) (17 - 18)  SpO2: 99% (14 Jul 2017 14:43) (99% - 100%)      General: NAD, comfortable  HEENT: NCAT, PERRL, clear conjunctiva, no scleral icterus  Neck: supple, no JVD, no lymphadenopathy   Respiratory: CTA b/l, no wheezing, rhonchi, rales  Cardiovascular: RRR, normal S1S2, no M/R/G  Abdomen: soft, NT/ND, bowel sounds in all four quadrants, no palpable masses  Extremities: WWP, large lower extremities without any edema, ACE bandage around right ankle and foot, clean, dry, and intact.   Skin: 2cm x 0.25cm lesion on lateral right ankle, at site of prior op scar, with small amount of drainage and pus  Neuro: AAO to person, place, and time. Remote and recent memory grossly intact. No focal neuro deficits  Psych: normal mood and affect. speech normal      LABS                        13.4   6.1   )-----------( 294      ( 14 Jul 2017 11:08 )             38.5     07-14    139  |  103  |  17  ----------------------------<  92  4.5   |  24  |  0.60    Ca    9.5      14 Jul 2017 11:08    TPro  7.2  /  Alb  3.7  /  TBili  0.5  /  DBili  x   /  AST  26  /  ALT  16  /  AlkPhos  61  07-14    PT/INR - ( 14 Jul 2017 11:08 )   PT: 11.7 sec;   INR: 1.05          PTT - ( 14 Jul 2017 11:08 )  PTT:34.4 sec    IMAGING & OTHER STUDIES: reviewed

## 2017-07-14 NOTE — ED ADULT NURSE NOTE - OBJECTIVE STATEMENT
Pt c/o poor wound healing to the lateral right ankle status post R ankle surgery and hardware placement on 03/23/2017. Pt c/o "very little discomfort" to the R ankle. Ambulates w/ assistance of a cane. Denies fevers, chills.

## 2017-07-14 NOTE — PROGRESS NOTE ADULT - SUBJECTIVE AND OBJECTIVE BOX
Patient is a 66y old  Female who presents with a chief complaint of right ankle wound (14 Jul 2017 11:16)    Diagnosis: Delayed wound healing right ankle  Procedure: Secondary closure right ankle with possible DIANA  Surgeon: Dr. Calloway                          13.4   6.1   )-----------( 294      ( 14 Jul 2017 11:08 )             38.5     07-14    139  |  103  |  17  ----------------------------<  92  4.5   |  24  |  0.60    Ca    9.5      14 Jul 2017 11:08    TPro  7.2  /  Alb  3.7  /  TBili  0.5  /  DBili  x   /  AST  26  /  ALT  16  /  AlkPhos  61  07-14    PT/INR - ( 14 Jul 2017 11:08 )   PT: 11.7 sec;   INR: 1.05          PTT - ( 14 Jul 2017 11:08 )  PTT:34.4 sec      [ ] Type & Screen  [x] CBC  [x] BMP  [x] PT/PTT/INR  [ ] Urinalysis  [x] Chest X-ray  [x] EKG  [x] NPO/IVF  [ ] Consent  [ ] Clearance  [ ] Added on to OR Schedule  [x] Anti-coagulation held    Assessment & Plan:  66yFemale with delayed wound healing right ankle  -For OR 7/15 Patient is a 66y old  Female who presents with a chief complaint of right ankle wound (14 Jul 2017 11:16)    Diagnosis: Delayed wound healing right ankle  Procedure: Secondary closure right ankle with possible DIANA  Surgeon: Dr. Calloway                          13.4   6.1   )-----------( 294      ( 14 Jul 2017 11:08 )             38.5     07-14    139  |  103  |  17  ----------------------------<  92  4.5   |  24  |  0.60    Ca    9.5      14 Jul 2017 11:08    TPro  7.2  /  Alb  3.7  /  TBili  0.5  /  DBili  x   /  AST  26  /  ALT  16  /  AlkPhos  61  07-14    PT/INR - ( 14 Jul 2017 11:08 )   PT: 11.7 sec;   INR: 1.05          PTT - ( 14 Jul 2017 11:08 )  PTT:34.4 sec      [ ] Type & Screen  [x] CBC  [x] BMP  [x] PT/PTT/INR  [ ] Urinalysis  [x] Chest X-ray  [x] EKG  [x] NPO/IVF  [ ] Consent  [ ] Clearance - Med consult service  [ ] Added on to OR Schedule  [x] Anti-coagulation held    Assessment & Plan:  66yFemale with delayed wound healing right ankle  -For OR 7/15

## 2017-07-14 NOTE — ED ADULT TRIAGE NOTE - CHIEF COMPLAINT QUOTE
c/o RLE wound with purulent drain. Pt quotes "I'm scheduled for surgery tomorrow and my doctor told me to come here to get my tests done." Denies fever or chills.

## 2017-07-14 NOTE — CONSULT NOTE ADULT - PROBLEM SELECTOR RECOMMENDATION 2
History of hypothyroidism, stable on Synthroid 175mcg daily for years.   - patient should continue Synthroid 175mcg daily. If unable to take orally tomorrow due to procedure, please give 87.5mcg IV in morning.  - recommend checking TSH in morning with AM labs History of hypothyroidism, stable on Synthroid 175mcg daily for years.   - patient should continue Synthroid 175mcg daily. If unable to take orally tomorrow due to procedure, please give 87.5mcg IV in morning.

## 2017-07-14 NOTE — CONSULT NOTE ADULT - ATTENDING COMMENTS
Seen and examined by me this evening. Called for pre-operative assessment, RCRI of 0, METS around 4 limited by ankle mobility. Combined clinical/surgical risk is low, can proceed for planned intervention that is a low/moderate risk procedure. C/w synthroid. DVT PPx post-operatively. Borderline elevated BP without h/o HTN, possibly stress/hospital stay/pain related, will monitor BP for now, no need to start anti-HTN. Thanks for the consult and we will continue to follow up.

## 2017-07-14 NOTE — ED PROVIDER NOTE - OBJECTIVE STATEMENT
66 yo F s/p R ankle surgery in March 2017 presents with R ankle pain.  Pt states the pain is "uncomfortbale" and is located around the surgical incision.  Pt states since surgery the wound has not been healing properly.  2 wks ago a scab fell off resulting in "crusting and pus."  She has also noticed increased redness and swelling in her R ankle.  She has been taking PO abx x 1 wk, last dose this morning, she does not recall which abx.  She has also been applying silvadene ointment to the wound.  She is scheduled for secondary wound closure tomorrow with Dr. Calloway.  Denies fever, chills, chest pain, SOB, decreased range of motion, or difficulty walking.

## 2017-07-14 NOTE — H&P ADULT - PROBLEM SELECTOR PLAN 1
Admit to Orthopaedic Service.  IV Abx   OR for wound closure vs DIANA pending lab/imaging results  Med clearance pending OR procedure   ID consult   NPO at midnight

## 2017-07-14 NOTE — ED PROVIDER NOTE - ATTENDING CONTRIBUTION TO CARE
I had a a face to face conversation with the patient about her presentation, her physical findings as specifically related to her right ankle. We discussed the clinical management and the need for her admission.

## 2017-07-14 NOTE — CONSULT NOTE ADULT - ASSESSMENT
66 year old female with history of hypothyroidism and R ankle ORIF (with plate and screws placed, March 2017) presenting with delayed wound healing vs wound infection, planned for surgery tomorrow, debridement with or without removal of hardware. Medicine consult for pre-op clearance.

## 2017-07-15 LAB
APPEARANCE UR: CLEAR — SIGNIFICANT CHANGE UP
BILIRUB UR-MCNC: NEGATIVE — SIGNIFICANT CHANGE UP
COLOR SPEC: YELLOW — SIGNIFICANT CHANGE UP
DIFF PNL FLD: NEGATIVE — SIGNIFICANT CHANGE UP
GLUCOSE UR QL: NEGATIVE — SIGNIFICANT CHANGE UP
KETONES UR-MCNC: NEGATIVE — SIGNIFICANT CHANGE UP
LEUKOCYTE ESTERASE UR-ACNC: NEGATIVE — SIGNIFICANT CHANGE UP
NITRITE UR-MCNC: NEGATIVE — SIGNIFICANT CHANGE UP
PH UR: 6.5 — SIGNIFICANT CHANGE UP (ref 5–8)
PROT UR-MCNC: NEGATIVE MG/DL — SIGNIFICANT CHANGE UP
SP GR SPEC: <=1.005 — SIGNIFICANT CHANGE UP (ref 1–1.03)
UROBILINOGEN FLD QL: 0.2 E.U./DL — SIGNIFICANT CHANGE UP

## 2017-07-15 RX ORDER — CEFAZOLIN SODIUM 1 G
2000 VIAL (EA) INJECTION EVERY 8 HOURS
Qty: 0 | Refills: 0 | Status: DISCONTINUED | OUTPATIENT
Start: 2017-07-15 | End: 2017-07-20

## 2017-07-15 RX ORDER — SODIUM CHLORIDE 9 MG/ML
1000 INJECTION, SOLUTION INTRAVENOUS
Qty: 0 | Refills: 0 | Status: DISCONTINUED | OUTPATIENT
Start: 2017-07-15 | End: 2017-07-15

## 2017-07-15 RX ORDER — DOCUSATE SODIUM 100 MG
100 CAPSULE ORAL THREE TIMES A DAY
Qty: 0 | Refills: 0 | Status: DISCONTINUED | OUTPATIENT
Start: 2017-07-15 | End: 2017-07-20

## 2017-07-15 RX ORDER — OXYCODONE HYDROCHLORIDE 5 MG/1
10 TABLET ORAL EVERY 4 HOURS
Qty: 0 | Refills: 0 | Status: DISCONTINUED | OUTPATIENT
Start: 2017-07-15 | End: 2017-07-20

## 2017-07-15 RX ORDER — HEPARIN SODIUM 5000 [USP'U]/ML
5000 INJECTION INTRAVENOUS; SUBCUTANEOUS EVERY 8 HOURS
Qty: 0 | Refills: 0 | Status: DISCONTINUED | OUTPATIENT
Start: 2017-07-15 | End: 2017-07-18

## 2017-07-15 RX ORDER — MORPHINE SULFATE 50 MG/1
4 CAPSULE, EXTENDED RELEASE ORAL EVERY 4 HOURS
Qty: 0 | Refills: 0 | Status: DISCONTINUED | OUTPATIENT
Start: 2017-07-15 | End: 2017-07-18

## 2017-07-15 RX ORDER — OXYCODONE HYDROCHLORIDE 5 MG/1
5 TABLET ORAL EVERY 4 HOURS
Qty: 0 | Refills: 0 | Status: DISCONTINUED | OUTPATIENT
Start: 2017-07-15 | End: 2017-07-20

## 2017-07-15 RX ORDER — KETOROLAC TROMETHAMINE 30 MG/ML
30 SYRINGE (ML) INJECTION EVERY 6 HOURS
Qty: 0 | Refills: 0 | Status: DISCONTINUED | OUTPATIENT
Start: 2017-07-15 | End: 2017-07-16

## 2017-07-15 RX ORDER — ONDANSETRON 8 MG/1
4 TABLET, FILM COATED ORAL EVERY 4 HOURS
Qty: 0 | Refills: 0 | Status: DISCONTINUED | OUTPATIENT
Start: 2017-07-15 | End: 2017-07-20

## 2017-07-15 RX ORDER — MORPHINE SULFATE 50 MG/1
4 CAPSULE, EXTENDED RELEASE ORAL
Qty: 0 | Refills: 0 | Status: DISCONTINUED | OUTPATIENT
Start: 2017-07-15 | End: 2017-07-15

## 2017-07-15 RX ADMIN — Medication 30 MILLIGRAM(S): at 23:25

## 2017-07-15 RX ADMIN — Medication 30 MILLIGRAM(S): at 15:20

## 2017-07-15 RX ADMIN — Medication 100 MILLIGRAM(S): at 05:54

## 2017-07-15 RX ADMIN — HEPARIN SODIUM 5000 UNIT(S): 5000 INJECTION INTRAVENOUS; SUBCUTANEOUS at 23:05

## 2017-07-15 RX ADMIN — OXYCODONE HYDROCHLORIDE 5 MILLIGRAM(S): 5 TABLET ORAL at 23:25

## 2017-07-15 RX ADMIN — SODIUM CHLORIDE 120 MILLILITER(S): 9 INJECTION, SOLUTION INTRAVENOUS at 00:08

## 2017-07-15 RX ADMIN — Medication 175 MICROGRAM(S): at 05:54

## 2017-07-15 RX ADMIN — Medication 100 MILLIGRAM(S): at 23:05

## 2017-07-15 RX ADMIN — Medication 30 MILLIGRAM(S): at 15:35

## 2017-07-15 NOTE — PROGRESS NOTE ADULT - SUBJECTIVE AND OBJECTIVE BOX
Orthopaedics Post Op Check    Procedure: secondary wound closure R ankle  Surgeon: Mingo    Pt comfortable, without complaints  Denies CP, SOB, N/V, numbness/tingling     Vital Signs Last 24 Hrs  T(C): 35.9 (15 Jul 2017 15:49), Max: 36.4 (14 Jul 2017 17:52)  T(F): 96.7 (15 Jul 2017 15:49), Max: 97.6 (14 Jul 2017 17:52)  HR: 59 (15 Jul 2017 15:49) (51 - 80)  BP: 129/84 (15 Jul 2017 15:49) (125/82 - 159/95)  BP(mean): --  RR: 17 (15 Jul 2017 15:49) (16 - 18)  SpO2: 99% (15 Jul 2017 15:49) (96% - 100%)  AVSS, NAD    Dressing C/D/I  General: Pt Alert and oriented     Pulses: +2 DP/PT bilaterally  Sensation: SILT  Motor: 5/5 FHL/EHL/TA/GS bilaterally                          13.4   6.1   )-----------( 294      ( 14 Jul 2017 11:08 )             38.5   07-14    139  |  103  |  17  ----------------------------<  92  4.5   |  24  |  0.60    Ca    9.5      14 Jul 2017 11:08    TPro  7.2  /  Alb  3.7  /  TBili  0.5  /  DBili  x   /  AST  26  /  ALT  16  /  AlkPhos  61  07-14      A/P: 66yFemale POD#0 s/p secondary wound closure R ankle  - Stable  - Pain Control  - pravena dressing  - DVT ppx: hep  - Post op abx: ancef standing  - PT, WBS: wbat  - F/U AM Labs

## 2017-07-16 LAB
ANION GAP SERPL CALC-SCNC: 12 MMOL/L — SIGNIFICANT CHANGE UP (ref 5–17)
BASOPHILS NFR BLD AUTO: 0.1 % — SIGNIFICANT CHANGE UP (ref 0–2)
BUN SERPL-MCNC: 15 MG/DL — SIGNIFICANT CHANGE UP (ref 7–23)
CALCIUM SERPL-MCNC: 8.8 MG/DL — SIGNIFICANT CHANGE UP (ref 8.4–10.5)
CHLORIDE SERPL-SCNC: 105 MMOL/L — SIGNIFICANT CHANGE UP (ref 96–108)
CO2 SERPL-SCNC: 23 MMOL/L — SIGNIFICANT CHANGE UP (ref 22–31)
CREAT SERPL-MCNC: 0.6 MG/DL — SIGNIFICANT CHANGE UP (ref 0.5–1.3)
GLUCOSE SERPL-MCNC: 116 MG/DL — HIGH (ref 70–99)
GRAM STN FLD: SIGNIFICANT CHANGE UP
HCT VFR BLD CALC: 37 % — SIGNIFICANT CHANGE UP (ref 34.5–45)
HGB BLD-MCNC: 12.3 G/DL — SIGNIFICANT CHANGE UP (ref 11.5–15.5)
LYMPHOCYTES # BLD AUTO: 13.2 % — SIGNIFICANT CHANGE UP (ref 13–44)
MCHC RBC-ENTMCNC: 30.4 PG — SIGNIFICANT CHANGE UP (ref 27–34)
MCHC RBC-ENTMCNC: 33.2 G/DL — SIGNIFICANT CHANGE UP (ref 32–36)
MCV RBC AUTO: 91.4 FL — SIGNIFICANT CHANGE UP (ref 80–100)
MONOCYTES NFR BLD AUTO: 8.7 % — SIGNIFICANT CHANGE UP (ref 2–14)
NEUTROPHILS NFR BLD AUTO: 78 % — HIGH (ref 43–77)
PLATELET # BLD AUTO: 248 K/UL — SIGNIFICANT CHANGE UP (ref 150–400)
POTASSIUM SERPL-MCNC: 4 MMOL/L — SIGNIFICANT CHANGE UP (ref 3.5–5.3)
POTASSIUM SERPL-SCNC: 4 MMOL/L — SIGNIFICANT CHANGE UP (ref 3.5–5.3)
RBC # BLD: 4.05 M/UL — SIGNIFICANT CHANGE UP (ref 3.8–5.2)
RBC # FLD: 13 % — SIGNIFICANT CHANGE UP (ref 10.3–16.9)
SODIUM SERPL-SCNC: 140 MMOL/L — SIGNIFICANT CHANGE UP (ref 135–145)
SPECIMEN SOURCE: SIGNIFICANT CHANGE UP
WBC # BLD: 6.8 K/UL — SIGNIFICANT CHANGE UP (ref 3.8–10.5)
WBC # FLD AUTO: 6.8 K/UL — SIGNIFICANT CHANGE UP (ref 3.8–10.5)

## 2017-07-16 PROCEDURE — 99233 SBSQ HOSP IP/OBS HIGH 50: CPT

## 2017-07-16 RX ORDER — SODIUM CHLORIDE 9 MG/ML
1000 INJECTION, SOLUTION INTRAVENOUS
Qty: 0 | Refills: 0 | Status: DISCONTINUED | OUTPATIENT
Start: 2017-07-16 | End: 2017-07-20

## 2017-07-16 RX ADMIN — OXYCODONE HYDROCHLORIDE 5 MILLIGRAM(S): 5 TABLET ORAL at 00:10

## 2017-07-16 RX ADMIN — HEPARIN SODIUM 5000 UNIT(S): 5000 INJECTION INTRAVENOUS; SUBCUTANEOUS at 05:45

## 2017-07-16 RX ADMIN — HEPARIN SODIUM 5000 UNIT(S): 5000 INJECTION INTRAVENOUS; SUBCUTANEOUS at 13:55

## 2017-07-16 RX ADMIN — Medication 100 MILLIGRAM(S): at 21:35

## 2017-07-16 RX ADMIN — Medication 175 MICROGRAM(S): at 05:45

## 2017-07-16 RX ADMIN — HEPARIN SODIUM 5000 UNIT(S): 5000 INJECTION INTRAVENOUS; SUBCUTANEOUS at 21:36

## 2017-07-16 RX ADMIN — Medication 30 MILLIGRAM(S): at 06:15

## 2017-07-16 RX ADMIN — Medication 100 MILLIGRAM(S): at 05:45

## 2017-07-16 RX ADMIN — Medication 100 MILLIGRAM(S): at 13:55

## 2017-07-16 RX ADMIN — Medication 30 MILLIGRAM(S): at 05:45

## 2017-07-16 RX ADMIN — Medication 100 MILLIGRAM(S): at 07:10

## 2017-07-16 RX ADMIN — Medication 100 MILLIGRAM(S): at 21:36

## 2017-07-16 RX ADMIN — Medication 100 MILLIGRAM(S): at 13:56

## 2017-07-16 RX ADMIN — Medication 30 MILLIGRAM(S): at 00:10

## 2017-07-16 RX ADMIN — Medication 1 TABLET(S): at 13:56

## 2017-07-16 NOTE — PROGRESS NOTE ADULT - SUBJECTIVE AND OBJECTIVE BOX
SUBJECTIVE: Patient seen and examined. Pain controlled.  Pt did well o/n  No f/c/n/v/cp/sob.     OBJECTIVE:  NAD  Vital Signs Last 24 Hrs  T(C): 36.1 (16 Jul 2017 14:25), Max: 36.2 (15 Jul 2017 20:45)  T(F): 97 (16 Jul 2017 14:25), Max: 97.2 (15 Jul 2017 20:45)  HR: 87 (16 Jul 2017 14:25) (51 - 87)  BP: 133/78 (16 Jul 2017 14:25) (114/72 - 151/69)  BP(mean): --  RR: 16 (16 Jul 2017 14:25) (16 - 17)  SpO2: 96% (16 Jul 2017 14:25) (96% - 99%)    Affected extremity:          Dressing: clean/dry/intact   , vac intact          Sensation: SILT         Motor exam: 5/5 TA/GS/EHL         warm well perfused; capillary refill <3 seconds                         12.3   6.8   )-----------( 248      ( 16 Jul 2017 05:51 )             37.0     07-16    140  |  105  |  15  ----------------------------<  116<H>  4.0   |  23  |  0.60    Ca    8.8      16 Jul 2017 05:52      A/P :  Pt is a 65yo Female s/p  R I&D vac ankle   -    Pain control  -    DVT ppx: HSQ    -  f/u cxs  -    Weight bearing status: protected weight bearing WBAT   -    Physical Therapy  -    Dispo: Home

## 2017-07-16 NOTE — PROGRESS NOTE ADULT - SUBJECTIVE AND OBJECTIVE BOX
Patient is a 66y old  Female who presents with a chief complaint of right ankle wound (14 Jul 2017 11:16)      24 hour events: Today POD1 s/p secondary would closure of R ankle    ROS:    Vital Signs Last 24 Hrs  T(C): 35.9 (16 Jul 2017 08:32), Max: 36.2 (15 Jul 2017 14:10)  T(F): 96.7 (16 Jul 2017 08:32), Max: 97.2 (15 Jul 2017 14:10)  HR: 66 (16 Jul 2017 08:32) (51 - 82)  BP: 132/78 (16 Jul 2017 08:32) (114/72 - 151/69)  BP(mean): --  RR: 16 (16 Jul 2017 08:32) (16 - 17)  SpO2: 98% (16 Jul 2017 08:32) (96% - 99%)  I&O's Summary    15 Jul 2017 07:01  -  16 Jul 2017 07:00  --------------------------------------------------------  IN: 1040 mL / OUT: 2010 mL / NET: -970 mL      CAPILLARY BLOOD GLUCOSE        PHYSICAL EXAM:      Constitutional:    Eyes:    ENMT:    Neck:    Breasts:    Back:    Respiratory:    Cardiovascular:    Gastrointestinal:    Genitourinary:    Rectal:    Extremities:    Vascular:    Neurological:    Skin:    Lymph Nodes:    Musculoskeletal:    Psychiatric:                              12.3   6.8   )-----------( 248      ( 16 Jul 2017 05:51 )             37.0     07-16    140  |  105  |  15  ----------------------------<  116<H>  4.0   |  23  |  0.60    Ca    8.8      16 Jul 2017 05:52      Imaging:     MEDICATIONS  (STANDING):  multivitamin 1 Tablet(s) Oral daily  levothyroxine 175 MICROGram(s) Oral daily  heparin  Injectable 5000 Unit(s) SubCutaneous every 8 hours  ceFAZolin   IVPB 2000 milliGRAM(s) IV Intermittent every 8 hours  docusate sodium 100 milliGRAM(s) Oral three times a day    MEDICATIONS  (PRN):  acetaminophen   Tablet 650 milliGRAM(s) Oral every 6 hours PRN For Temp greater than 38 C (100.4 F)  oxyCODONE    5 mG/acetaminophen 325 mG 1 Tablet(s) Oral every 6 hours PRN Moderate Pain  morphine  - Injectable 2 milliGRAM(s) IV Push every 4 hours PRN breakthrough pain  oxyCODONE    IR 10 milliGRAM(s) Oral every 4 hours PRN Moderate Pain  oxyCODONE    IR 5 milliGRAM(s) Oral every 4 hours PRN Mild Pain  morphine  - Injectable 4 milliGRAM(s) IV Push every 4 hours PRN Severe Pain  ondansetron Injectable 4 milliGRAM(s) IV Push every 4 hours PRN Nausea and/or Vomiting      This is a 66y Female with a history of Hypothyroid  HTN (hypertension)   admitted for WOUND INFECTION  .  HEALTH ISSUES - PROBLEM Dx:  HTN (hypertension): HTN (hypertension) - BP at goal, no need for med  Hypothyroid: Hypothyroid - continue home dose of synthroid  Wound infection: Wound infection - would care per surgery, on ancef periop  Delayed wound healing: Delayed wound healing - s/p secondary would closure  Post-Op Care Patient is a 66y old  Female who presents with a chief complaint of right ankle wound (14 Jul 2017 11:16)      24 hour events: Today POD1 s/p secondary would closure of R ankle    ROS: Pain well controlled.  Tolerating po.  No BM yet    Vital Signs Last 24 Hrs  T(C): 35.9 (16 Jul 2017 08:32), Max: 36.2 (15 Jul 2017 14:10)  T(F): 96.7 (16 Jul 2017 08:32), Max: 97.2 (15 Jul 2017 14:10)  HR: 66 (16 Jul 2017 08:32) (51 - 82)  BP: 132/78 (16 Jul 2017 08:32) (114/72 - 151/69)  BP(mean): --  RR: 16 (16 Jul 2017 08:32) (16 - 17)  SpO2: 98% (16 Jul 2017 08:32) (96% - 99%)  I&O's Summary    15 Jul 2017 07:01  -  16 Jul 2017 07:00  --------------------------------------------------------  IN: 1040 mL / OUT: 2010 mL / NET: -970 mL      CAPILLARY BLOOD GLUCOSE        PHYSICAL EXAM:      Constitutional: NAD    Respiratory: CTAB    Cardiovascular: RRR    Gastrointestinal: NTND    Extremities: RLE wrapped w/ c/d/i drsg w/ wound vac                          12.3   6.8   )-----------( 248      ( 16 Jul 2017 05:51 )             37.0     07-16    140  |  105  |  15  ----------------------------<  116<H>  4.0   |  23  |  0.60    Ca    8.8      16 Jul 2017 05:52      Imaging:     MEDICATIONS  (STANDING):  multivitamin 1 Tablet(s) Oral daily  levothyroxine 175 MICROGram(s) Oral daily  heparin  Injectable 5000 Unit(s) SubCutaneous every 8 hours  ceFAZolin   IVPB 2000 milliGRAM(s) IV Intermittent every 8 hours  docusate sodium 100 milliGRAM(s) Oral three times a day    MEDICATIONS  (PRN):  acetaminophen   Tablet 650 milliGRAM(s) Oral every 6 hours PRN For Temp greater than 38 C (100.4 F)  oxyCODONE    5 mG/acetaminophen 325 mG 1 Tablet(s) Oral every 6 hours PRN Moderate Pain  morphine  - Injectable 2 milliGRAM(s) IV Push every 4 hours PRN breakthrough pain  oxyCODONE    IR 10 milliGRAM(s) Oral every 4 hours PRN Moderate Pain  oxyCODONE    IR 5 milliGRAM(s) Oral every 4 hours PRN Mild Pain  morphine  - Injectable 4 milliGRAM(s) IV Push every 4 hours PRN Severe Pain  ondansetron Injectable 4 milliGRAM(s) IV Push every 4 hours PRN Nausea and/or Vomiting      This is a 66y Female with a history of Hypothyroid  HTN (hypertension)   admitted for WOUND INFECTION  .  HEALTH ISSUES - PROBLEM Dx:  HTN (hypertension): HTN (hypertension) - BP at goal, no need for med  Hypothyroid: Hypothyroid - continue home dose of synthroid  Wound infection: Wound infection - would care per surgery, on ancef periop  Delayed wound healing: Delayed wound healing - s/p secondary would closure  Post-Op Care - pain controlled, tolerating po, awaiting BM

## 2017-07-17 LAB
ANION GAP SERPL CALC-SCNC: 11 MMOL/L — SIGNIFICANT CHANGE UP (ref 5–17)
BUN SERPL-MCNC: 19 MG/DL — SIGNIFICANT CHANGE UP (ref 7–23)
CALCIUM SERPL-MCNC: 8.7 MG/DL — SIGNIFICANT CHANGE UP (ref 8.4–10.5)
CHLORIDE SERPL-SCNC: 106 MMOL/L — SIGNIFICANT CHANGE UP (ref 96–108)
CO2 SERPL-SCNC: 25 MMOL/L — SIGNIFICANT CHANGE UP (ref 22–31)
CREAT SERPL-MCNC: 0.7 MG/DL — SIGNIFICANT CHANGE UP (ref 0.5–1.3)
CULTURE RESULTS: NO GROWTH — SIGNIFICANT CHANGE UP
GLUCOSE SERPL-MCNC: 89 MG/DL — SIGNIFICANT CHANGE UP (ref 70–99)
POTASSIUM SERPL-MCNC: 4.6 MMOL/L — SIGNIFICANT CHANGE UP (ref 3.5–5.3)
POTASSIUM SERPL-SCNC: 4.6 MMOL/L — SIGNIFICANT CHANGE UP (ref 3.5–5.3)
SODIUM SERPL-SCNC: 142 MMOL/L — SIGNIFICANT CHANGE UP (ref 135–145)
SPECIMEN SOURCE: SIGNIFICANT CHANGE UP

## 2017-07-17 PROCEDURE — 99232 SBSQ HOSP IP/OBS MODERATE 35: CPT | Mod: GC

## 2017-07-17 RX ORDER — MAGNESIUM HYDROXIDE 400 MG/1
30 TABLET, CHEWABLE ORAL DAILY
Qty: 0 | Refills: 0 | Status: DISCONTINUED | OUTPATIENT
Start: 2017-07-17 | End: 2017-07-20

## 2017-07-17 RX ORDER — HYDROMORPHONE HYDROCHLORIDE 2 MG/ML
1 INJECTION INTRAMUSCULAR; INTRAVENOUS; SUBCUTANEOUS EVERY 6 HOURS
Qty: 0 | Refills: 0 | Status: DISCONTINUED | OUTPATIENT
Start: 2017-07-17 | End: 2017-07-20

## 2017-07-17 RX ORDER — SENNA PLUS 8.6 MG/1
2 TABLET ORAL AT BEDTIME
Qty: 0 | Refills: 0 | Status: DISCONTINUED | OUTPATIENT
Start: 2017-07-17 | End: 2017-07-20

## 2017-07-17 RX ADMIN — HYDROMORPHONE HYDROCHLORIDE 1 MILLIGRAM(S): 2 INJECTION INTRAMUSCULAR; INTRAVENOUS; SUBCUTANEOUS at 12:00

## 2017-07-17 RX ADMIN — Medication 100 MILLIGRAM(S): at 05:56

## 2017-07-17 RX ADMIN — HYDROMORPHONE HYDROCHLORIDE 1 MILLIGRAM(S): 2 INJECTION INTRAMUSCULAR; INTRAVENOUS; SUBCUTANEOUS at 11:31

## 2017-07-17 RX ADMIN — Medication 650 MILLIGRAM(S): at 23:16

## 2017-07-17 RX ADMIN — Medication 100 MILLIGRAM(S): at 13:42

## 2017-07-17 RX ADMIN — Medication 100 MILLIGRAM(S): at 13:41

## 2017-07-17 RX ADMIN — Medication 100 MILLIGRAM(S): at 05:54

## 2017-07-17 RX ADMIN — Medication 1 TABLET(S): at 13:42

## 2017-07-17 RX ADMIN — Medication 100 MILLIGRAM(S): at 22:15

## 2017-07-17 RX ADMIN — HEPARIN SODIUM 5000 UNIT(S): 5000 INJECTION INTRAVENOUS; SUBCUTANEOUS at 22:16

## 2017-07-17 RX ADMIN — HEPARIN SODIUM 5000 UNIT(S): 5000 INJECTION INTRAVENOUS; SUBCUTANEOUS at 13:42

## 2017-07-17 RX ADMIN — ONDANSETRON 4 MILLIGRAM(S): 8 TABLET, FILM COATED ORAL at 14:18

## 2017-07-17 RX ADMIN — Medication 175 MICROGRAM(S): at 05:55

## 2017-07-17 RX ADMIN — HEPARIN SODIUM 5000 UNIT(S): 5000 INJECTION INTRAVENOUS; SUBCUTANEOUS at 05:55

## 2017-07-17 NOTE — PROGRESS NOTE ADULT - ASSESSMENT
66 year old female with history of hypothyroidism and R ankle ORIF (with plate and screws placed, March 2017) presented with delayed wound healing, no s/p debridement and placement of wound vac on R ankle wound.

## 2017-07-17 NOTE — PROGRESS NOTE ADULT - PROBLEM SELECTOR PLAN 3
History of hypothyroidism, stable on Synthroid 175mcg daily for years.   - patient should continue Synthroid 175mcg daily. History of hypothyroidism, stable on Synthroid 175mcg daily for years.   - patient should continue Synthroid 175mcg daily.    - signing off, please reconsult with any additional questions

## 2017-07-17 NOTE — PROGRESS NOTE ADULT - PROBLEM SELECTOR PLAN 2
Delayed wound healing, s/p secondary wound closure. Per surgery, plan to surgically close wound in next few days.   - plan as per primary team

## 2017-07-17 NOTE — PROGRESS NOTE ADULT - SUBJECTIVE AND OBJECTIVE BOX
INTERVAL HPI/OVERNIGHT EVENTS: Patient POD 2, from debridement of right ankle and wound vac placement. Patient denies any current complaints, denies headache, dizziness, chest pain, palpitations, abdominal pain, nausea, vomiting, constipation, diarrhea, dysuria.     MEDICATIONS  (STANDING):  multivitamin 1 Tablet(s) Oral daily  levothyroxine 175 MICROGram(s) Oral daily  heparin  Injectable 5000 Unit(s) SubCutaneous every 8 hours  ceFAZolin   IVPB 2000 milliGRAM(s) IV Intermittent every 8 hours  docusate sodium 100 milliGRAM(s) Oral three times a day  lactated ringers. 1000 milliLiter(s) (100 mL/Hr) IV Continuous <Continuous>  senna 2 Tablet(s) Oral at bedtime  bisacodyl 5 milliGRAM(s) Oral every 12 hours    MEDICATIONS  (PRN):  acetaminophen   Tablet 650 milliGRAM(s) Oral every 6 hours PRN For Temp greater than 38 C (100.4 F)  morphine  - Injectable 2 milliGRAM(s) IV Push every 4 hours PRN breakthrough pain  oxyCODONE    IR 10 milliGRAM(s) Oral every 4 hours PRN Moderate Pain  oxyCODONE    IR 5 milliGRAM(s) Oral every 4 hours PRN Mild Pain  morphine  - Injectable 4 milliGRAM(s) IV Push every 4 hours PRN Severe Pain  ondansetron Injectable 4 milliGRAM(s) IV Push every 4 hours PRN Nausea and/or Vomiting  HYDROmorphone  Injectable 1 milliGRAM(s) IV Push every 6 hours PRN Severe Pain (7 - 10)  magnesium hydroxide Suspension 30 milliLiter(s) Oral daily PRN Constipation      Allergies    No Known Allergies    Intolerances    VITAL SIGNS:  T(F): 97.6 (07-17-17 @ 13:58)  HR: 50 (07-17-17 @ 13:58)  BP: 118/79 (07-17-17 @ 13:58)  RR: 17 (07-17-17 @ 13:58)  SpO2: 97% (07-17-17 @ 13:58)  Wt(kg): --    PHYSICAL EXAM:    General: NAD, comfortable  HEENT: NCAT, PERRL, clear conjunctiva, no scleral icterus  Neck: supple, no JVD  Respiratory: CTA b/l, good air entry b/l, no wheezing, rhonchi, rales  Cardiovascular: RRR, normal S1S2, no M/R/G  Abdomen: soft, NT/ND, bowel sounds in all four quadrants, no palpable masses  Extremities: WWP, RLE wrapped in dressing which is clean, dry, and intact, with wound vac in place.   Neurological: AOx3, no focal deficits      LABS:                        12.3   6.8   )-----------( 248      ( 16 Jul 2017 05:51 )             37.0     07-17    142  |  106  |  19  ----------------------------<  89  4.6   |  25  |  0.70    Ca    8.7      17 Jul 2017 06:23      Surgical Swab R ankle wound (7/15) x 3: No growth   Tissue culture from R ankle (7/15): no growth  Blood culture (7/14) x 2 sets: No growth at 3 days      RADIOLOGY & ADDITIONAL TESTS: reviewed INTERVAL HPI/OVERNIGHT EVENTS: Patient POD 2, from debridement of right ankle and wound vac placement. Patient denies any current complaints, denies headache, dizziness, chest pain, palpitations, abdominal pain, nausea, vomiting, diarrhea, dysuria. Does have constipation     MEDICATIONS  (STANDING):  multivitamin 1 Tablet(s) Oral daily  levothyroxine 175 MICROGram(s) Oral daily  heparin  Injectable 5000 Unit(s) SubCutaneous every 8 hours  ceFAZolin   IVPB 2000 milliGRAM(s) IV Intermittent every 8 hours  docusate sodium 100 milliGRAM(s) Oral three times a day  lactated ringers. 1000 milliLiter(s) (100 mL/Hr) IV Continuous <Continuous>  senna 2 Tablet(s) Oral at bedtime  bisacodyl 5 milliGRAM(s) Oral every 12 hours    MEDICATIONS  (PRN):  acetaminophen   Tablet 650 milliGRAM(s) Oral every 6 hours PRN For Temp greater than 38 C (100.4 F)  morphine  - Injectable 2 milliGRAM(s) IV Push every 4 hours PRN breakthrough pain  oxyCODONE    IR 10 milliGRAM(s) Oral every 4 hours PRN Moderate Pain  oxyCODONE    IR 5 milliGRAM(s) Oral every 4 hours PRN Mild Pain  morphine  - Injectable 4 milliGRAM(s) IV Push every 4 hours PRN Severe Pain  ondansetron Injectable 4 milliGRAM(s) IV Push every 4 hours PRN Nausea and/or Vomiting  HYDROmorphone  Injectable 1 milliGRAM(s) IV Push every 6 hours PRN Severe Pain (7 - 10)  magnesium hydroxide Suspension 30 milliLiter(s) Oral daily PRN Constipation      Allergies    No Known Allergies    Intolerances    VITAL SIGNS:  T(F): 97.6 (07-17-17 @ 13:58)  HR: 50 (07-17-17 @ 13:58)  BP: 118/79 (07-17-17 @ 13:58)  RR: 17 (07-17-17 @ 13:58)  SpO2: 97% (07-17-17 @ 13:58)  Wt(kg): --    PHYSICAL EXAM:    General: NAD, comfortable  HEENT: NCAT, PERRL, clear conjunctiva, no scleral icterus  Neck: supple, no JVD  Respiratory: CTA b/l, good air entry b/l, no wheezing, rhonchi, rales  Cardiovascular: RRR, normal S1S2, no M/R/G  Abdomen: soft, NT/ND, bowel sounds in all four quadrants, no palpable masses  Extremities: WWP, RLE wrapped in dressing which is clean, dry, and intact, with wound vac in place.   Neurological: AOx3, no focal deficits      LABS:                        12.3   6.8   )-----------( 248      ( 16 Jul 2017 05:51 )             37.0     07-17    142  |  106  |  19  ----------------------------<  89  4.6   |  25  |  0.70    Ca    8.7      17 Jul 2017 06:23      Surgical Swab R ankle wound (7/15) x 3: No growth   Tissue culture from R ankle (7/15): no growth  Blood culture (7/14) x 2 sets: No growth at 3 days      RADIOLOGY & ADDITIONAL TESTS: reviewed

## 2017-07-17 NOTE — PROGRESS NOTE ADULT - PROBLEM SELECTOR PLAN 1
Presented with wound infection vs delayed wound healing of right ankle wound. Now s/p debridement and wound vac placement. Did not remove hardware.   - wound care per surgery  - currently on Ancef, antibiotics per primary team  - on HSQ for VTE prophylaxis   - recommend incentive spirometry   - currently with constipation, ordered for Dulcolax and milk of magnesia starting today, in addition to Colace and Senna.

## 2017-07-17 NOTE — PROGRESS NOTE ADULT - ATTENDING COMMENTS
Seen and examined by me this afternoon. Doing well post-operatively, c/w pain control and bowel regimen. C/w Abx. Enc IS and OOB. BP acceptable, no need for BP meds at present time. Signing off and please reconsult prn.

## 2017-07-17 NOTE — PROGRESS NOTE ADULT - SUBJECTIVE AND OBJECTIVE BOX
ORTHO NOTE    [x ] Pt seen/examined 12:15 PM.  [ ] Pt without any complaints/in NAD.    [x] Pt complains of: Complaining of constipation, +flatus.      ROS: [ ] Fever  [ ] Chills  [ ] CP [ ] SOB [ ] Dysnea  [ ] Palpitations [ ] Cough [ ] N/V/C/D [ ] Paresthia [ ] Other     [x ] ROS  otherwise negative    .    PHYSICAL EXAM:    Vital Signs Last 24 Hrs  T(C): 36 (17 Jul 2017 08:50), Max: 36.3 (16 Jul 2017 20:43)  T(F): 96.8 (17 Jul 2017 08:50), Max: 97.3 (16 Jul 2017 20:43)  HR: 76 (17 Jul 2017 08:50) (67 - 87)  BP: 143/84 (17 Jul 2017 08:50) (123/78 - 143/84)  BP(mean): --  RR: 18 (17 Jul 2017 08:50) (15 - 18)  SpO2: 97% (17 Jul 2017 08:50) (92% - 97%)    I&O's Detail    16 Jul 2017 07:01  -  17 Jul 2017 07:00  --------------------------------------------------------  IN:    lactated ringers.: 650 mL    Oral Fluid: 1020 mL    Solution: 200 mL  Total IN: 1870 mL    OUT:    Voided: 1700 mL  Total OUT: 1700 mL    Total NET: 170 mL      17 Jul 2017 07:01  -  17 Jul 2017 13:47  --------------------------------------------------------  IN:    Oral Fluid: 680 mL  Total IN: 680 mL    OUT:    Voided: 800 mL  Total OUT: 800 mL    Total NET: -120 mL           CAPILLARY BLOOD GLUCOSE                      Neuro: NAD, A + O x3    Lungs:    CV:    ABD: +BS x4, soft NT/ND    Ext: RLE + wound vac, ACE wrap dressing C/D/I. SILT, wiggling toes.    LABS   17 Jul 2017 06:23    142    |  106    |  19     ----------------------------<  89     4.6     |  25     |  0.70     Ca    8.7        17 Jul 2017 06:23                                   12.3   6.8   )-----------( 248      ( 16 Jul 2017 05:51 )             37.0                 [ ] Other Labs  [ ] None ordered            Please check or [place] when present:  •  Heart Failure:    [ ] Acute        [ ]  Acute on Chronic        [ ] Chronic         [ ] Diastolic     [ ]  Combined    •  SERA:     [ ] ATN        [ ]  Renal medullary necrosis       [ ]  Renal cortical necrosis                  [ ] Other pathological Lesion:  •  CKD:  [ ] Stage I   [ ] Stage II  [ ] Stage III    [ ]Stage IV   [ ]  CKD V   [ ]  Other/Unspecified:    •  Abdominal Nutritional Status:   [ ] Malnutrition-See Nutrition note    [ ] Cachexia   [ ]  Other        [ ] Supplement ordered:            [ ] Morbid Obesity: BMI >=40         ASSESSMENT/PLAN:      STATUS POST: Right ankle I&D with woundvac placement POD #2.    CONTINUE:          [x ] PT: WBAT    [x ] DVT PPX- HSQ    [x ] Pain Mgt: Continue current regimen.    [x ] Dispo plan-home    Med SVC following (Dr. Paz).    Increased bowel regimen.    Plan for OR either Wednesday or Thursday.    Woundvac changed today by Dr. Calloway and Dr. Mason.    Final wound Cx from 7/15 negative (Final).

## 2017-07-17 NOTE — PROGRESS NOTE ADULT - SUBJECTIVE AND OBJECTIVE BOX
SUBJECTIVE: Patient seen and examined. Pain controlled.  Pt did well o/n  No f/c/n/v/cp/sob.     OBJECTIVE:  NAD  Vital Signs Last 24 Hrs  T(C): 37.4 (17 Jul 2017 17:23), Max: 37.4 (17 Jul 2017 17:23)  T(F): 99.3 (17 Jul 2017 17:23), Max: 99.3 (17 Jul 2017 17:23)  HR: 67 (17 Jul 2017 17:23) (50 - 76)  BP: 144/83 (17 Jul 2017 17:23) (118/79 - 144/83)  BP(mean): --  RR: 18 (17 Jul 2017 17:23) (16 - 18)  SpO2: 99% (17 Jul 2017 17:23) (94% - 99%)    Affected extremity:          Dressing: clean/dry/intact   , vac intact          Sensation: SILT         Motor exam: 5/5 TA/GS/EHL         warm well perfused; capillary refill <3 seconds                                  12.3   6.8   )-----------( 248      ( 16 Jul 2017 05:51 )             37.0     07-17    142  |  106  |  19  ----------------------------<  89  4.6   |  25  |  0.70    Ca    8.7      17 Jul 2017 06:23        A/P :  Pt is a 67yo Female s/p  R I&D vac ankle   -    Pain control  -    DVT ppx: HSQ    -  f/u cxs - NGTD  -    Weight bearing status: protected weight bearing WBAT   -    Physical Therapy  -    Dispo: Home

## 2017-07-17 NOTE — PROGRESS NOTE ADULT - SUBJECTIVE AND OBJECTIVE BOX
Patient is a 66y old  Female who presents with a chief complaint of right ankle wound (14 Jul 2017 11:16)    Diagnosis: Delayed wound healing R ankle, r/o osteo  Procedure: Secondary closure with possible DIANA right ankle  Surgeon: Brodie                          12.3   6.8   )-----------( 248      ( 16 Jul 2017 05:51 )             37.0     07-17    142  |  106  |  19  ----------------------------<  89  4.6   |  25  |  0.70    Ca    8.7      17 Jul 2017 06:23            [x ] Type & Screen  [x ] CBC  [x ] BMP  [x ] PT/PTT/INR  [x ] Urinalysis  [x ] Chest X-ray  [x ] EKG  [x ] NPO @MN preop  [x ] Consent  [x ] Clearance  [ ] Add on to OR Schedule for Wednesday or Thursday  [ x] Anti-coagulation held    Assessment & Plan:  66yFemale for Secondary closure with possible DIANA right ankle  -For OR 7/19 or 7/20

## 2017-07-17 NOTE — PROGRESS NOTE ADULT - PROBLEM SELECTOR PLAN 4
Patient denies history of HTN, most of readings in 130's, which is at goal. No further medication needed at this time.

## 2017-07-18 LAB
ANION GAP SERPL CALC-SCNC: 11 MMOL/L — SIGNIFICANT CHANGE UP (ref 5–17)
BUN SERPL-MCNC: 14 MG/DL — SIGNIFICANT CHANGE UP (ref 7–23)
CALCIUM SERPL-MCNC: 9.1 MG/DL — SIGNIFICANT CHANGE UP (ref 8.4–10.5)
CHLORIDE SERPL-SCNC: 105 MMOL/L — SIGNIFICANT CHANGE UP (ref 96–108)
CO2 SERPL-SCNC: 25 MMOL/L — SIGNIFICANT CHANGE UP (ref 22–31)
CREAT SERPL-MCNC: 0.7 MG/DL — SIGNIFICANT CHANGE UP (ref 0.5–1.3)
GLUCOSE SERPL-MCNC: 86 MG/DL — SIGNIFICANT CHANGE UP (ref 70–99)
HCT VFR BLD CALC: 36.3 % — SIGNIFICANT CHANGE UP (ref 34.5–45)
HGB BLD-MCNC: 12.2 G/DL — SIGNIFICANT CHANGE UP (ref 11.5–15.5)
MCHC RBC-ENTMCNC: 30.7 PG — SIGNIFICANT CHANGE UP (ref 27–34)
MCHC RBC-ENTMCNC: 33.6 G/DL — SIGNIFICANT CHANGE UP (ref 32–36)
MCV RBC AUTO: 91.4 FL — SIGNIFICANT CHANGE UP (ref 80–100)
PLATELET # BLD AUTO: 260 K/UL — SIGNIFICANT CHANGE UP (ref 150–400)
POTASSIUM SERPL-MCNC: 3.9 MMOL/L — SIGNIFICANT CHANGE UP (ref 3.5–5.3)
POTASSIUM SERPL-SCNC: 3.9 MMOL/L — SIGNIFICANT CHANGE UP (ref 3.5–5.3)
RBC # BLD: 3.97 M/UL — SIGNIFICANT CHANGE UP (ref 3.8–5.2)
RBC # FLD: 13.5 % — SIGNIFICANT CHANGE UP (ref 10.3–16.9)
SODIUM SERPL-SCNC: 141 MMOL/L — SIGNIFICANT CHANGE UP (ref 135–145)
WBC # BLD: 6.4 K/UL — SIGNIFICANT CHANGE UP (ref 3.8–10.5)
WBC # FLD AUTO: 6.4 K/UL — SIGNIFICANT CHANGE UP (ref 3.8–10.5)

## 2017-07-18 RX ORDER — HEPARIN SODIUM 5000 [USP'U]/ML
5000 INJECTION INTRAVENOUS; SUBCUTANEOUS EVERY 8 HOURS
Qty: 0 | Refills: 0 | Status: DISCONTINUED | OUTPATIENT
Start: 2017-07-18 | End: 2017-07-20

## 2017-07-18 RX ADMIN — Medication 100 MILLIGRAM(S): at 06:46

## 2017-07-18 RX ADMIN — Medication 1 TABLET(S): at 12:01

## 2017-07-18 RX ADMIN — Medication 100 MILLIGRAM(S): at 22:08

## 2017-07-18 RX ADMIN — HEPARIN SODIUM 5000 UNIT(S): 5000 INJECTION INTRAVENOUS; SUBCUTANEOUS at 06:46

## 2017-07-18 RX ADMIN — Medication 100 MILLIGRAM(S): at 14:16

## 2017-07-18 RX ADMIN — Medication 100 MILLIGRAM(S): at 14:17

## 2017-07-18 RX ADMIN — HEPARIN SODIUM 5000 UNIT(S): 5000 INJECTION INTRAVENOUS; SUBCUTANEOUS at 14:17

## 2017-07-18 RX ADMIN — Medication 175 MICROGRAM(S): at 06:46

## 2017-07-18 NOTE — PROGRESS NOTE ADULT - SUBJECTIVE AND OBJECTIVE BOX
ORTHO NOTE    [x ] Pt seen/examined 12:30 PM, ambulating in hallway.  [x ] Pt without any complaints/in NAD. +BM today.    [ ] Pt complains of:      ROS: [ ] Fever  [ ] Chills  [ ] CP [ ] SOB [ ] Dysnea  [ ] Palpitations [ ] Cough [ ] N/V/C/D [ ] Paresthia [ ] Other     [x ] ROS  otherwise negative    .    PHYSICAL EXAM:    Vital Signs Last 24 Hrs  T(C): 36.7 (18 Jul 2017 13:38), Max: 37.4 (17 Jul 2017 17:23)  T(F): 98.1 (18 Jul 2017 13:38), Max: 99.4 (17 Jul 2017 20:50)  HR: 82 (18 Jul 2017 13:38) (64 - 82)  BP: 147/83 (18 Jul 2017 13:38) (119/78 - 147/83)  BP(mean): --  RR: 16 (18 Jul 2017 13:38) (16 - 18)  SpO2: 99% (18 Jul 2017 13:38) (96% - 100%)    I&O's Detail    17 Jul 2017 07:01  -  18 Jul 2017 07:00  --------------------------------------------------------  IN:    Oral Fluid: 1290 mL    Solution: 50 mL  Total IN: 1340 mL    OUT:    VAC (Vacuum Assisted Closure) System: 25 mL    Voided: 2150 mL  Total OUT: 2175 mL    Total NET: -835 mL      18 Jul 2017 07:01  -  18 Jul 2017 14:32  --------------------------------------------------------  IN:    Oral Fluid: 530 mL  Total IN: 530 mL    OUT:    Voided: 900 mL  Total OUT: 900 mL    Total NET: -370 mL           CAPILLARY BLOOD GLUCOSE                      Neuro:    Lungs:    CV:    ABD:     Ext: 5/5 FHL/GS/TA/EHL, SILT B/L.   RLE ACE wrap C/D/I, wound vac in place.    LABS   18 Jul 2017 06:52    141    |  105    |  14     ----------------------------<  86     3.9     |  25     |  0.70     Ca    9.1        18 Jul 2017 06:52                                   12.2   6.4   )-----------( 260      ( 18 Jul 2017 06:52 )             36.3                 [ ] Other Labs  [ ] None ordered            Please check or [place] when present:  •  Heart Failure:    [ ] Acute        [ ]  Acute on Chronic        [ ] Chronic         [ ] Diastolic     [ ]  Combined    •  SERA:     [ ] ATN        [ ]  Renal medullary necrosis       [ ]  Renal cortical necrosis                  [ ] Other pathological Lesion:  •  CKD:  [ ] Stage I   [ ] Stage II  [ ] Stage III    [ ]Stage IV   [ ]  CKD V   [ ]  Other/Unspecified:    •  Abdominal Nutritional Status:   [ ] Malnutrition-See Nutrition note    [ ] Cachexia   [ ]  Other        [ ] Supplement ordered:            [ ] Morbid Obesity: BMI >=40         ASSESSMENT/PLAN:      STATUS POST: Right ankle I&D and wound vac placement, POD#3    CONTINUE:          [x ] PT WBAT    [x ] DVT PPX- HSQ    [x ] Pain Mgt: Continue current regimen.    [x ] Dispo plan-home  Plan for OR likely Thursday - Wound vac change vs closure.  Continue IV Abx.  Med consult - [place].

## 2017-07-19 LAB
CULTURE RESULTS: SIGNIFICANT CHANGE UP
CULTURE RESULTS: SIGNIFICANT CHANGE UP
SPECIMEN SOURCE: SIGNIFICANT CHANGE UP
SPECIMEN SOURCE: SIGNIFICANT CHANGE UP

## 2017-07-19 RX ADMIN — Medication 100 MILLIGRAM(S): at 22:56

## 2017-07-19 RX ADMIN — Medication 1 TABLET(S): at 14:35

## 2017-07-19 RX ADMIN — Medication 100 MILLIGRAM(S): at 06:22

## 2017-07-19 RX ADMIN — Medication 175 MICROGRAM(S): at 06:22

## 2017-07-19 RX ADMIN — Medication 100 MILLIGRAM(S): at 14:36

## 2017-07-19 RX ADMIN — HEPARIN SODIUM 5000 UNIT(S): 5000 INJECTION INTRAVENOUS; SUBCUTANEOUS at 14:35

## 2017-07-19 RX ADMIN — HEPARIN SODIUM 5000 UNIT(S): 5000 INJECTION INTRAVENOUS; SUBCUTANEOUS at 06:25

## 2017-07-19 RX ADMIN — HEPARIN SODIUM 5000 UNIT(S): 5000 INJECTION INTRAVENOUS; SUBCUTANEOUS at 22:56

## 2017-07-19 NOTE — PROGRESS NOTE ADULT - SUBJECTIVE AND OBJECTIVE BOX
ORTHO NOTE    [ ] Pt seen/examined.  [ ] Pt without any complaints/in NAD.    [ ] Pt complains of:      ROS: [ ] Fever  [ ] Chills  [ ] CP [ ] SOB [ ] Dysnea  [ ] Palpitations [ ] Cough [ ] N/V/C/D [ ] Paresthia [ ] Other     [ ] ROS  otherwise negative    .    PHYSICAL EXAM:    Vital Signs Last 24 Hrs  T(C): 36.3 (19 Jul 2017 10:37), Max: 36.7 (18 Jul 2017 13:38)  T(F): 97.3 (19 Jul 2017 10:37), Max: 98.1 (18 Jul 2017 13:38)  HR: 81 (19 Jul 2017 10:37) (69 - 82)  BP: 135/82 (19 Jul 2017 10:37) (135/82 - 147/83)  BP(mean): --  RR: 16 (19 Jul 2017 10:37) (16 - 17)  SpO2: 98% (19 Jul 2017 10:37) (95% - 99%)    I&O's Detail    18 Jul 2017 07:01  -  19 Jul 2017 07:00  --------------------------------------------------------  IN:    Oral Fluid: 530 mL  Total IN: 530 mL    OUT:    Voided: 1200 mL  Total OUT: 1200 mL    Total NET: -670 mL           CAPILLARY BLOOD GLUCOSE                      Neuro:    Lungs:    CV:    ABD:     Ext:    LABS                        12.2   6.4   )-----------( 260      ( 18 Jul 2017 06:52 )             36.3                                07-18    141  |  105  |  14  ----------------------------<  86  3.9   |  25  |  0.70    Ca    9.1      18 Jul 2017 06:52        [ ] Other Labs  [ ] None ordered            Please check or [place] when present:  •  Heart Failure:    [ ] Acute        [ ]  Acute on Chronic        [ ] Chronic         [ ] Diastolic     [ ]  Combined    •  SERA:     [ ] ATN        [ ]  Renal medullary necrosis       [ ]  Renal cortical necrosis                  [ ] Other pathological Lesion:  •  CKD:  [ ] Stage I   [ ] Stage II  [ ] Stage III    [ ]Stage IV   [ ]  CKD V   [ ]  Other/Unspecified:    •  Abdominal Nutritional Status:   [ ] Malnutrition-See Nutrition note    [ ] Cachexia   [ ]  Other        [ ] Supplement ordered:            [ ] Morbid Obesity: BMI >=40         ASSESSMENT/PLAN:      STATUS POST:     CONTINUE:          [ ] PT    [ ] DVT PPX-    [ ] Pain Mgt    [ ] Dispo plan- ORTHO NOTE    [X ] Pt seen/examined.  [ ] Pt without any complaints/in NAD.    [X ] Pt complains of:  incisional pain is controlled.  Has been oob.  Had another BM.       ROS: [ ] Fever  [ ] Chills  [ ] CP [ ] SOB [ ] Dysnea  [ ] Palpitations [ ] Cough [ ] N/V/C/D [ ] Paresthia [ ] Other     [X ] ROS  otherwise negative    .    PHYSICAL EXAM:    Vital Signs Last 24 Hrs  T(C): 36.3 (19 Jul 2017 10:37), Max: 36.7 (18 Jul 2017 13:38)  T(F): 97.3 (19 Jul 2017 10:37), Max: 98.1 (18 Jul 2017 13:38)  HR: 81 (19 Jul 2017 10:37) (69 - 82)  BP: 135/82 (19 Jul 2017 10:37) (135/82 - 147/83)  BP(mean): --  RR: 16 (19 Jul 2017 10:37) (16 - 17)  SpO2: 98% (19 Jul 2017 10:37) (95% - 99%)    I&O's Detail    18 Jul 2017 07:01  -  19 Jul 2017 07:00  --------------------------------------------------------  IN:    Oral Fluid: 530 mL  Total IN: 530 mL    OUT:    Voided: 1200 mL  Total OUT: 1200 mL    Total NET: -670 mL           CAPILLARY BLOOD GLUCOSE                      Neuro:  NAD A and O x 3    Lungs:    CV:    ABD:     Ext:  RLE + wound vac ace wrap dsg c/d/i TA/EHL/FHL/GS silt    LABS                        12.2   6.4   )-----------( 260      ( 18 Jul 2017 06:52 )             36.3                                07-18    141  |  105  |  14  ----------------------------<  86  3.9   |  25  |  0.70    Ca    9.1      18 Jul 2017 06:52        [ ] Other Labs  [ ] None ordered            Please check or [place] when present:  •  Heart Failure:    [ ] Acute        [ ]  Acute on Chronic        [ ] Chronic         [ ] Diastolic     [ ]  Combined    •  SERA:     [ ] ATN        [ ]  Renal medullary necrosis       [ ]  Renal cortical necrosis                  [ ] Other pathological Lesion:  •  CKD:  [ ] Stage I   [ ] Stage II  [ ] Stage III    [ ]Stage IV   [ ]  CKD V   [ ]  Other/Unspecified:    •  Abdominal Nutritional Status:   [ ] Malnutrition-See Nutrition note    [ ] Cachexia   [ ]  Other        [ ] Supplement ordered:            [ ] Morbid Obesity: BMI >=40         ASSESSMENT/PLAN:      STATUS POST: R ankle I and D and wound vac placement POD 4    CONTINUE:          [ ] PT wbat    [ ] DVT PPX- sqh, hold the morning of surgery    [ ] Pain Mgt  con't current regimen    [ ] Dispo plan- pending OR    Con't wound vac.  To return to OR tomorrow with plastics and ortho for wound closure if appropriate.  Still on IV abx.  OR cx from the 15th are negative.

## 2017-07-19 NOTE — PROGRESS NOTE ADULT - SUBJECTIVE AND OBJECTIVE BOX
SUBJECTIVE: Patient seen and examined. Pain controlled.  Pt did well o/n  No f/c/n/v/cp/sob.     OBJECTIVE:  NAD  Vital Signs stable    Affected extremity:          Dressing: clean/dry/intact  , vac intact          Sensation: SILT         Motor exam: 5/5 TA/GS/EHL         warm well perfused; capillary refill <3 seconds           A/P :  Pt is a 67yo Female s/p  R ankle wound I&D vac  -    Pain control  -    DVT ppx: HSQ   -    Weight bearing status: protected WB  -    Physical Therapy  -    Dispo: OR tomorrow

## 2017-07-19 NOTE — DIETITIAN INITIAL EVALUATION ADULT. - OTHER INFO
Pt admitted with delayed wound healing R ankle s/p ORIF 4 months ago. s/p I&D ankle wound vac 7/15. Pt reports she is tolerating regular diet with good PO intake.  Denies any n/v/d/c; +bowel function. Denies any pain. Pt reports a 20 lb wt loss 5 months ago, yet  has since maintained current wt. Attributes this wt loss to hospitalization back in March 2017. Reports good appetite/intake at home. NKFA.

## 2017-07-19 NOTE — DIETITIAN INITIAL EVALUATION ADULT. - ENERGY NEEDS
Height: 65" Weight: 199lbs, IBW 125lbs+/-10%, %%, BMI - 33.2  IBW used to calculate energy needs due to pt's current body weight exceeding 120% of IBW   Nutrient needs based on [facility] standards of care for maintenance in adults.

## 2017-07-20 VITALS
SYSTOLIC BLOOD PRESSURE: 113 MMHG | TEMPERATURE: 98 F | RESPIRATION RATE: 16 BRPM | HEART RATE: 89 BPM | OXYGEN SATURATION: 97 % | DIASTOLIC BLOOD PRESSURE: 76 MMHG

## 2017-07-20 RX ORDER — ONDANSETRON 8 MG/1
4 TABLET, FILM COATED ORAL EVERY 6 HOURS
Qty: 0 | Refills: 0 | Status: DISCONTINUED | OUTPATIENT
Start: 2017-07-20 | End: 2017-07-20

## 2017-07-20 RX ORDER — OXYCODONE AND ACETAMINOPHEN 5; 325 MG/1; MG/1
1 TABLET ORAL EVERY 4 HOURS
Qty: 0 | Refills: 0 | Status: DISCONTINUED | OUTPATIENT
Start: 2017-07-20 | End: 2017-07-20

## 2017-07-20 RX ORDER — MORPHINE SULFATE 50 MG/1
4 CAPSULE, EXTENDED RELEASE ORAL
Qty: 0 | Refills: 0 | Status: DISCONTINUED | OUTPATIENT
Start: 2017-07-20 | End: 2017-07-20

## 2017-07-20 RX ORDER — DOCUSATE SODIUM 100 MG
100 CAPSULE ORAL THREE TIMES A DAY
Qty: 0 | Refills: 0 | Status: DISCONTINUED | OUTPATIENT
Start: 2017-07-20 | End: 2017-07-20

## 2017-07-20 RX ORDER — SODIUM CHLORIDE 9 MG/ML
1000 INJECTION, SOLUTION INTRAVENOUS
Qty: 0 | Refills: 0 | Status: DISCONTINUED | OUTPATIENT
Start: 2017-07-20 | End: 2017-07-20

## 2017-07-20 RX ORDER — OXYCODONE HYDROCHLORIDE 5 MG/1
2 TABLET ORAL
Qty: 0 | Refills: 0 | COMMUNITY
Start: 2017-07-20

## 2017-07-20 RX ORDER — LEVOTHYROXINE SODIUM 125 MCG
175 TABLET ORAL DAILY
Qty: 0 | Refills: 0 | Status: DISCONTINUED | OUTPATIENT
Start: 2017-07-21 | End: 2017-07-20

## 2017-07-20 RX ORDER — OXYCODONE AND ACETAMINOPHEN 5; 325 MG/1; MG/1
2 TABLET ORAL EVERY 4 HOURS
Qty: 0 | Refills: 0 | Status: DISCONTINUED | OUTPATIENT
Start: 2017-07-20 | End: 2017-07-20

## 2017-07-20 RX ORDER — CEFAZOLIN SODIUM 1 G
2000 VIAL (EA) INJECTION EVERY 8 HOURS
Qty: 0 | Refills: 0 | Status: DISCONTINUED | OUTPATIENT
Start: 2017-07-20 | End: 2017-07-20

## 2017-07-20 RX ADMIN — OXYCODONE AND ACETAMINOPHEN 1 TABLET(S): 5; 325 TABLET ORAL at 16:58

## 2017-07-20 RX ADMIN — Medication 1 TABLET(S): at 15:26

## 2017-07-20 RX ADMIN — MORPHINE SULFATE 4 MILLIGRAM(S): 50 CAPSULE, EXTENDED RELEASE ORAL at 09:15

## 2017-07-20 RX ADMIN — Medication 175 MICROGRAM(S): at 06:08

## 2017-07-20 RX ADMIN — Medication 100 MILLIGRAM(S): at 06:07

## 2017-07-20 RX ADMIN — MORPHINE SULFATE 4 MILLIGRAM(S): 50 CAPSULE, EXTENDED RELEASE ORAL at 09:06

## 2017-07-20 RX ADMIN — Medication 100 MILLIGRAM(S): at 15:26

## 2017-07-20 RX ADMIN — Medication 100 MILLIGRAM(S): at 16:26

## 2017-07-20 RX ADMIN — OXYCODONE AND ACETAMINOPHEN 1 TABLET(S): 5; 325 TABLET ORAL at 16:00

## 2017-07-20 NOTE — DISCHARGE NOTE ADULT - CARE PLAN
Principal Discharge DX:	Delayed wound healing  Goal:	To improve functional status, promote healing of skin, and prevent infection.  Instructions for follow-up, activity and diet:	see below  Secondary Diagnosis:	Wound infection  Secondary Diagnosis:	Ankle injury, right, sequela

## 2017-07-20 NOTE — DISCHARGE NOTE ADULT - HOSPITAL COURSE
Admit   Pt to OR for Secondary wound closure with wound vac placement in OR on 7/15 on 7/17 Woundvac changed at bedside. Final Wound Cx negative. Pt continued on IV Ancef throughout course of stay. Pt returned to OR for wound washout and wound closure w/Plastics on 7/20/17.  Perioperative antibiotics administered  DVT ppx while inpatient  Physical Therapy  Pain management  Pt stable and cleared by Dr. Calloway for discharge on 7/20/17. Admit   Med consult  Pt to OR for Secondary wound closure with wound vac placement in OR on 7/15 on 7/17 Woundvac changed at bedside. Final Wound Cx negative. Pt continued on IV Ancef throughout course of stay. Pt returned to OR for wound washout and wound closure w/Plastics on 7/20/17.  Perioperative antibiotics administered  DVT ppx while inpatient  Physical Therapy  Pain management  Pt stable and cleared by Dr. Calloway for discharge on 7/20/17.

## 2017-07-20 NOTE — DISCHARGE NOTE ADULT - ADDITIONAL INSTRUCTIONS
No strenuous activity, heavy lifting, driving, tub bathing, or returning to work until cleared by MD.  Remove dressing after post op day 5-7, then leave incision open to air.  Follow up with Dr. Calloway call his office to schedule an appt.  Contact your doctor if you experience: fever greater than 101.5, chills, chest pain, difficulty breathing, bleeding, redness or heat around the incision. No strenuous activity, heavy lifting, driving, tub bathing, or returning to work until cleared by MD.  You are weight bearing as tolerated.   You may shower/spongebathe but keep dressing clean and dry.    Remove dressing after post op day 5-7, then leave incision open to air.  Any staples/sutures will be removed in his office.    Follow up with Dr. Calloway/Dr Mason  in 2 weeks from surgery, call his office to schedule an appt.  Contact your doctor if you experience: fever greater than 101.5, chills, chest pain, difficulty breathing, bleeding, redness or heat around the incision.

## 2017-07-20 NOTE — PROGRESS NOTE ADULT - SUBJECTIVE AND OBJECTIVE BOX
SUBJECTIVE: Patient seen and examined. Pain controlled.  Pt did well o/n  No f/c/n/v/cp/sob.     OBJECTIVE:  NAD  Vital Signs Last 24 Hrs  T(C): 36.8 (20 Jul 2017 06:38), Max: 36.9 (19 Jul 2017 20:40)  T(F): 98.3 (20 Jul 2017 06:38), Max: 98.4 (19 Jul 2017 20:40)  HR: 68 (20 Jul 2017 06:38) (68 - 88)  BP: 144/87 (20 Jul 2017 06:38) (135/82 - 152/82)  BP(mean): --  RR: 17 (20 Jul 2017 06:38) (16 - 18)  SpO2: 97% (20 Jul 2017 06:38) (94% - 98%)    Affected extremity:          Dressing: clean/dry/intact vac  intact         Sensation: SILT         Motor exam: 5/5 TA/GS/EHL         warm well perfused; capillary refill <3 seconds           A/P :  Pt is a 67yo Female s/p R   ankle  wound I&D, vac   -    Weight bearing status: protected WB  -    Physical Therapy  -    Dispo: OR today

## 2017-07-20 NOTE — DISCHARGE NOTE ADULT - MEDICATION SUMMARY - MEDICATIONS TO TAKE
I will START or STAY ON the medications listed below when I get home from the hospital:    acetaminophen-oxycodone 325 mg-5 mg oral tablet  -- 1 to 2 tab(s) by mouth every 4 hours, As needed  -- Indication: For pain    docusate sodium 100 mg oral capsule  -- 1 cap(s) by mouth 3 times a day  -- Indication: For constipation    clindamycin 300 mg oral capsule  -- 1 cap(s) by mouth every 8 hours for 10 days total  -- Finish all this medication unless otherwise directed by prescriber.  Medication should be taken with plenty of water.    -- Indication: For Antibiotic    levothyroxine 175 mcg (0.175 mg) oral tablet  -- 1 tab(s) by mouth once a day  -- Indication: For Hypothyroid

## 2017-07-20 NOTE — DISCHARGE NOTE ADULT - MEDICATION SUMMARY - MEDICATIONS TO STOP TAKING
I will STOP taking the medications listed below when I get home from the hospital:    aspirin 325 mg oral delayed release tablet  -- 1 tab(s) by mouth 2 times a day x 4 weeks from the date of surgery

## 2017-07-20 NOTE — DISCHARGE NOTE ADULT - CARE PROVIDER_API CALL
Ray Calloway), Orthopaedic Surgery  [address] [place] [address]  Phone: [phone]  Fax: [phone]    Rocky Mason), Plastic Surgery; Surgery  [address] [place] [address]  Phone: [phone]  Fax: [phone]

## 2017-07-20 NOTE — PROGRESS NOTE ADULT - SUBJECTIVE AND OBJECTIVE BOX
Orthopaedic Post Op Check Note    Procedure: Right ankle wound I&D and wound closure  Surgeon: Dr. Calloway/Dr Mason    66y Female comfortable, stable and alert in PACU. Pain control improving with medications. Pt endorses mild discomfort at her ankle but states she feels very well post-op. Denies N/V, CP, SOB, numbness/tingling of extremities.    PE: AVSS, NAD  Vital Signs Last 24 Hrs  T(C): 36.2 (20 Jul 2017 10:58), Max: 36.9 (19 Jul 2017 20:40)  T(F): 97.1 (20 Jul 2017 10:58), Max: 98.4 (19 Jul 2017 20:40)  HR: 70 (20 Jul 2017 10:58) (60 - 88)  BP: 138/81 (20 Jul 2017 10:58) (131/61 - 152/82)  BP(mean): --  RR: 16 (20 Jul 2017 10:58) (12 - 18)  SpO2: 98% (20 Jul 2017 10:58) (94% - 98%)    General: Pt alert and oriented, reclined in hospital bed in NAD.  Dressing: Bulky dressing in place and C/D/I to Right ankle.  Motor: Slightly decreased ROM to Right ankle due to pain. Motor Strength 5/5 to TA/GS/EHL/FHL bilaterally.   Neuro: Sensation intact to bilateral LE distally.   Pulses: DP 1+, Brisk cap refill, Toes wwp.    A/P: 66y Female POD#0 s/p Right ankle wound I&D and wound closure  - Stable  - Pain Control adequate at this time.  - DVT ppx: SCDs  - Leticia op IV abx: Ancef (continue dosing until discharge)  - PT, WBS: WBAT - pt cleared by PT previously.  - IVF: LR until tolerating PO.  - Pt to be discharged home today and should f/u with Dr. Calloway as previously indicated after discharge.  - Pt in agreement with plan for d/c home today.    Yuliya Buchanan PA-C  Ortho Consult Pager: [phone]

## 2017-07-24 DIAGNOSIS — Z79.82 LONG TERM (CURRENT) USE OF ASPIRIN: ICD-10-CM

## 2017-07-24 DIAGNOSIS — E03.9 HYPOTHYROIDISM, UNSPECIFIED: ICD-10-CM

## 2017-07-24 DIAGNOSIS — I10 ESSENTIAL (PRIMARY) HYPERTENSION: ICD-10-CM

## 2017-07-24 DIAGNOSIS — T81.4XXA INFECTION FOLLOWING A PROCEDURE, INITIAL ENCOUNTER: ICD-10-CM

## 2017-07-24 DIAGNOSIS — T81.89XA OTHER COMPLICATIONS OF PROCEDURES, NOT ELSEWHERE CLASSIFIED, INITIAL ENCOUNTER: ICD-10-CM
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
## 2017-07-24 PROCEDURE — 85610 PROTHROMBIN TIME: CPT

## 2017-07-24 PROCEDURE — 36415 COLL VENOUS BLD VENIPUNCTURE: CPT

## 2017-07-24 PROCEDURE — 87070 CULTURE OTHR SPECIMN AEROBIC: CPT

## 2017-07-24 PROCEDURE — 81003 URINALYSIS AUTO W/O SCOPE: CPT

## 2017-07-24 PROCEDURE — 80048 BASIC METABOLIC PNL TOTAL CA: CPT

## 2017-07-24 PROCEDURE — 71046 X-RAY EXAM CHEST 2 VIEWS: CPT

## 2017-07-24 PROCEDURE — 87040 BLOOD CULTURE FOR BACTERIA: CPT

## 2017-07-24 PROCEDURE — 87075 CULTR BACTERIA EXCEPT BLOOD: CPT

## 2017-07-24 PROCEDURE — 80053 COMPREHEN METABOLIC PANEL: CPT

## 2017-07-24 PROCEDURE — A9585: CPT

## 2017-07-24 PROCEDURE — 96374 THER/PROPH/DIAG INJ IV PUSH: CPT

## 2017-07-24 PROCEDURE — 86140 C-REACTIVE PROTEIN: CPT

## 2017-07-24 PROCEDURE — 86901 BLOOD TYPING SEROLOGIC RH(D): CPT

## 2017-07-24 PROCEDURE — 85730 THROMBOPLASTIN TIME PARTIAL: CPT

## 2017-07-24 PROCEDURE — 85027 COMPLETE CBC AUTOMATED: CPT

## 2017-07-24 PROCEDURE — 86900 BLOOD TYPING SEROLOGIC ABO: CPT

## 2017-07-24 PROCEDURE — 93005 ELECTROCARDIOGRAM TRACING: CPT

## 2017-07-24 PROCEDURE — 85025 COMPLETE CBC W/AUTO DIFF WBC: CPT

## 2017-07-24 PROCEDURE — 73723 MRI JOINT LWR EXTR W/O&W/DYE: CPT

## 2017-07-24 PROCEDURE — 99285 EMERGENCY DEPT VISIT HI MDM: CPT | Mod: 25

## 2017-07-24 PROCEDURE — 85652 RBC SED RATE AUTOMATED: CPT

## 2017-07-24 PROCEDURE — 97161 PT EVAL LOW COMPLEX 20 MIN: CPT

## 2017-07-24 PROCEDURE — 86850 RBC ANTIBODY SCREEN: CPT

## 2017-07-24 PROCEDURE — 73700 CT LOWER EXTREMITY W/O DYE: CPT

## 2018-06-22 NOTE — ED PROVIDER NOTE - EYES, MLM
Please call pt to tell her that the radiologist saw a new nodule on the lung. This is most likely benign but could be an early pneumonia or scarring from an old infection. I would like her to see Dr Frias next week for a follow up of her cough and also about the nodule. Please make an appt for her. Thanks Clear bilaterally, pupils equal, round and reactive to light.

## 2019-04-05 NOTE — DISCHARGE NOTE ADULT - PATIENT PORTAL LINK FT
“You can access the Follow[facility] Patient Portal, offered by [facility], by registering with the following website: [email or web address]”
99

## 2020-01-06 NOTE — ED ADULT NURSE NOTE - CAS DISCH ACCOMP BY
----- Message from Ledy Pat MD sent at 1/4/2020  1:54 PM CST -----  Lipids are abnormal, please watch diet, exercise, take omega-3 daily (2-3 g/day ), follow up fasting in 6 months,  Other labs are okay    01/06/2020  327pm  Pt was called and left message on machine.  
Transporter

## 2020-04-10 NOTE — ED PROVIDER NOTE - EYES, MLM
-- DO NOT REPLY / DO NOT REPLY ALL --  -- Message is from the [facility]--    COVID-19 Universal Screening: Negative    General Patient Message      Reason for Call: Patient's mother called to rescheduled today's 04/10/2020 appointment for an earlier time, however [facility] is only scheduling telephone visits until further notice. Mom states she was instructed that she could bring in patient for an acute visit she is now scheduled at 2:20p.m. can the nurse give patient a call back if she will be allowed to bring in patient or if it's going to stay as a telephone call for safety.    Caller Information       Type Contact Phone    04/10/2020 12:06 PM Phone (Incoming) DIAZWON (Mother) [phone]          Alternative phone number: n/a    Turnaround time given to caller:   \"This message will be sent to [state Provider's name]. The clinical team will fulfill your request as soon as they review your message.\"    
Spoke with mom and confirmed that appointment is in the office at 2:20.  
Clear bilaterally, pupils equal, round and reactive to light.

## 2021-12-20 PROBLEM — Z00.00 ENCOUNTER FOR PREVENTIVE HEALTH EXAMINATION: Status: ACTIVE | Noted: 2021-12-20

## 2022-03-24 ENCOUNTER — RESULT REVIEW (OUTPATIENT)
Age: 72
End: 2022-03-24

## 2022-07-29 NOTE — PATIENT PROFILE ADULT. - MEDICATION HERBAL REMEDIES, PROFILE
Discussed discharge instructions with parents. All questions and concerns addressed at this time. All personal belongings taken by parents. Patient d/c home with parents via private car.      no

## 2022-10-18 NOTE — PROGRESS NOTE ADULT - SUBJECTIVE AND OBJECTIVE BOX
10/18/2022       RE: Sheila Barraza  [address] [place] [id number]     Dear Colleague,    Thank you for referring your patient, Sheila Barraza, to the [facility] [place] at [facility]. Please see a copy of my visit note below.    Feeling better. Eyes still bother her (tight band around the eyes). Left corneal injury (?) - numerous drops. Balance is poor but no falls. Balance therapy helped a lot. No sense of taste, forces herself to eat. Swallowing is ok but soft foods are difficult (bread). Left face numbness/pain. On gabapentin, has side effects (100 mg bid). Burning dysesthesias in left face is constant but the pre-rhizotomy pain has resolved. Right face numbness is milder.     Mild dysarthria    One episode of right arm and leg numbness following a chiropractor appt 2 weeks ago. Went to ED and blood pressure was very high. Still has some numbness. BP much better now.     Repeat MRI in [place], [place] in one year.     See dictated note.    JULIAN Prabhakar MD      Service Date: 10/18/2022    Fior Jensen NP  [address] [address]    RE:      Sheila Barraza  MRN:  [id number]  :   1947    Dear Ms. Jensen:    We spoke to Ms. Barraza as part of a telephone followup in [facility] today.  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  She also had left-sided trigeminal neuralgia.  She underwent a left-sided percutaneous radiofrequency rhizotomy with Dr. Pierre in 2020.  Nearly a month ago, she had an episode of right arm and leg numbness following a chiropractic manipulation.  She went to the emergency department, and her blood pressure was apparently very high.  This was treated, and she was observed overnight and discharged.  It sounds like a stroke workup was done, and this was unremarkable.  Her blood  SUBJECTIVE: Patient seen and examined. Pain controlled.  Pt did well o/n  Vac change at bedside yesterday.    OBJECTIVE:  NAD  Vital Signs Last 24 Hrs  T(C): 36.5 (18 Jul 2017 05:32), Max: 37.4 (17 Jul 2017 17:23)  T(F): 97.7 (18 Jul 2017 05:32), Max: 99.4 (17 Jul 2017 20:50)  HR: 75 (18 Jul 2017 05:32) (50 - 76)  BP: 119/78 (18 Jul 2017 05:32) (118/79 - 144/83)  BP(mean): --  RR: 17 (18 Jul 2017 05:32) (17 - 18)  SpO2: 96% (18 Jul 2017 05:32) (96% - 100%)    Affected extremity:          Dressing: clean/dry/intact, vac intact          Sensation: SILT         Motor exam: 5/5 TA/GS/EHL         warm well perfused; capillary refill <3 seconds                  07-17    142  |  106  |  19  ----------------------------<  89  4.6   |  25  |  0.70    Ca    8.7      17 Jul 2017 06:23        A/P :  Pt is a 65yo Female s/p  R I&D vac ankle   -    Pain control  -    DVT ppx: HSQ    -     f/u cxs - NGTD  -    Weight bearing status: protected weight bearing WBAT   -    Physical Therapy  -    Dispo: Home pressure is under better control now.  She still has some residual numbness on the right side.    Overall, she is feeling better.  She still feels like she has a tight band around both eyes.  She has numbness in the left eye and continues to lubricate this generously.  She has burning dysesthesias in the left face that are constant, but the pre-rhizotomy tic pain has resolved.  She remains on gabapentin, but at a low dose, 100 mg b.i.d.  It seems like she has some fatigue even on this low dose.  She has disequilibrium and gait instability, but no falls.  She found balance therapy to be quite useful.  Her sense of taste remains diminished, and she forces herself to eat.  She has not lost any weight because of this. She still has some difficulty with swallowing soft foods, such as bread.  She also has some numbness in the right face, but this is very mild.    Over the phone, the most notable feature is mild dysarthria, which is likely due to her facial numbness.  Otherwise, her language and phonation are normal.    REVIEW OF STUDIES:  We went over her MRI from 08/02/2022, and we also reviewed the MRI and MRA from 09/15/2022.  The MRI shows some postsurgical changes and hemosiderin staining in the dorsal medulla, but no obvious residual or recurrent cavernous malformation.  The prominent venous angioma running through the left dorsolateral vannessa and middle cerebellar peduncle and extending into the cerebellopontine cistern is unchanged.  The MRA of the neck shows no dissection or stenosis in either internal carotid artery or vertebral artery.    IMPRESSION AND PLAN:  Ms. Barraza has plateaued in her recovery.  She can certainly continue using her chiropractic therapy, but perhaps less aggressive manipulation is better.  She should continue to have her blood pressure management with you.  We will repeat an MRI of the brain in 1 year, and this can be done in [place], and we will follow up with her afterwards.  Please do not  hesitate to contact us with questions.    Sincerely,    Catarina Prabhakar MD        D: 10/18/2022   T: 10/18/2022   MT: suma    Name:     TANIKA RIVAS  MRN:      [id number]        Account:      [id number]   :      1947           Service Date: 10/18/2022       Document: [id number]

## 2023-01-12 NOTE — PHYSICAL THERAPY INITIAL EVALUATION ADULT - LEVEL OF INDEPENDENCE: SCOOT/BRIDGE, REHAB EVAL
1.  Renew prescription for coreg  2.  Send his labs done her primary care  3.  Walk and exercise  4.  Routine routine ICD follow-up  5.  Follow myself 12 months   12-Jan-2023 07:12 independent

## 2024-06-24 NOTE — ED ADULT TRIAGE NOTE - HEART RATE (BEATS/MIN)
Subjective   Patient ID: Oralia Gates is a 25 y.o. female who presents for Spot on back (Patient here for a spot on R back area for 3 weeks. No pain, but discoloration ).    HPI Has lesion on right upper buttock that is purple but indents when touched. 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   Skin:  Positive for color change.       Objective   There were no vitals taken for this visit.    Physical   HEEENT normal   Heart normal rhythm , regular  Lungs clear  Skin: right upper buttocks triangular bg lesion with purple central discoloration, soft and mild indentation      Assessment/Plan   Problem List Items Addressed This Visit    None  Visit Diagnoses         Codes    Skin lesion    -  Primary L98.9    Relevant Orders    Referral to Dermatology    Carpal tunnel syndrome of left wrist     G56.02        Advise wrist bracing , motrin prn of pain       
99

## 2025-03-07 PROBLEM — E03.9 HYPOTHYROIDISM, UNSPECIFIED: Chronic | Status: ACTIVE | Noted: 2017-03-15

## 2025-03-07 PROBLEM — I10 ESSENTIAL (PRIMARY) HYPERTENSION: Chronic | Status: ACTIVE | Noted: 2017-03-15

## 2025-04-16 ENCOUNTER — APPOINTMENT (OUTPATIENT)
Dept: ENDOCRINOLOGY | Facility: CLINIC | Age: 75
End: 2025-04-16
Payer: MEDICARE

## 2025-04-16 VITALS
SYSTOLIC BLOOD PRESSURE: 162 MMHG | OXYGEN SATURATION: 95 % | WEIGHT: 193 LBS | HEART RATE: 79 BPM | TEMPERATURE: 98.3 F | DIASTOLIC BLOOD PRESSURE: 91 MMHG

## 2025-04-16 DIAGNOSIS — E03.9 HYPOTHYROIDISM, UNSPECIFIED: ICD-10-CM

## 2025-04-16 DIAGNOSIS — Z00.00 ENCOUNTER FOR GENERAL ADULT MEDICAL EXAMINATION W/OUT ABNORMAL FINDINGS: ICD-10-CM

## 2025-04-16 PROCEDURE — 99204 OFFICE O/P NEW MOD 45 MIN: CPT

## 2025-04-16 RX ORDER — LEVOTHYROXINE SODIUM 0.15 MG/1
150 TABLET ORAL
Refills: 0 | Status: ACTIVE | COMMUNITY

## 2025-04-17 RX ORDER — LEVOTHYROXINE SODIUM 0.15 MG/1
150 TABLET ORAL DAILY
Qty: 1 | Refills: 1 | Status: ACTIVE | COMMUNITY
Start: 2025-04-16 | End: 1900-01-01

## 2025-04-18 LAB
25(OH)D3 SERPL-MCNC: 18.4 NG/ML
ALBUMIN SERPL ELPH-MCNC: 4.3 G/DL
ALP BLD-CCNC: 68 U/L
ALT SERPL-CCNC: 12 U/L
ANION GAP SERPL CALC-SCNC: 16 MMOL/L
AST SERPL-CCNC: 10 U/L
BILIRUB SERPL-MCNC: 0.6 MG/DL
BUN SERPL-MCNC: 14 MG/DL
CALCIUM SERPL-MCNC: 9.4 MG/DL
CHLORIDE SERPL-SCNC: 105 MMOL/L
CHOLEST SERPL-MCNC: 231 MG/DL
CO2 SERPL-SCNC: 21 MMOL/L
CREAT SERPL-MCNC: 0.65 MG/DL
CREAT SPEC-SCNC: 29 MG/DL
EGFRCR SERPLBLD CKD-EPI 2021: 93 ML/MIN/1.73M2
ESTIMATED AVERAGE GLUCOSE: 108 MG/DL
GLUCOSE SERPL-MCNC: 89 MG/DL
HBA1C MFR BLD HPLC: 5.4 %
HDLC SERPL-MCNC: 72 MG/DL
LDLC SERPL-MCNC: 146 MG/DL
MICROALBUMIN 24H UR DL<=1MG/L-MCNC: <1.2 MG/DL
MICROALBUMIN/CREAT 24H UR-RTO: NORMAL MG/G
NONHDLC SERPL-MCNC: 159 MG/DL
POTASSIUM SERPL-SCNC: 4.3 MMOL/L
PROT SERPL-MCNC: 6.9 G/DL
SODIUM SERPL-SCNC: 143 MMOL/L
T4 FREE SERPL-MCNC: 1.6 NG/DL
TRIGL SERPL-MCNC: 77 MG/DL
TSH SERPL-ACNC: 2.5 UIU/ML
VIT B12 SERPL-MCNC: 464 PG/ML

## 2025-05-06 NOTE — PHYSICAL THERAPY INITIAL EVALUATION ADULT - ASSISTIVE DEVICE, REHAB EVAL
Patient's (Body mass index is 24.61 kg/m².) indicates that they are overweight with health conditions that include dyslipidemias . Weight is improving with treatment. BMI is above average; BMI management plan is completed. We discussed low calorie, low carb based diet program, portion control, increasing exercise, pharmacologic options including qsymia, and an kitri-based approach such as [employer] Pal or Lose It.   --This is a follow up visit and she is down 2.6 lbs  --Taper Qsymia. Start taking every other day or PRN. Shamar and MIRANDA reviewed.   --Body comp reviewed. She is within a healthy fat range     bed rails

## 2025-06-19 ENCOUNTER — OUTPATIENT (OUTPATIENT)
Dept: OUTPATIENT SERVICES | Facility: HOSPITAL | Age: 75
LOS: 1 days | End: 2025-06-19

## 2025-06-19 ENCOUNTER — APPOINTMENT (OUTPATIENT)
Dept: RADIOLOGY | Facility: CLINIC | Age: 75
End: 2025-06-19
Payer: MEDICARE

## 2025-06-19 DIAGNOSIS — S99.911S UNSPECIFIED INJURY OF RIGHT ANKLE, SEQUELA: Chronic | ICD-10-CM

## 2025-06-19 PROCEDURE — 73552 X-RAY EXAM OF FEMUR 2/>: CPT | Mod: 26,RT
